# Patient Record
Sex: MALE | Race: NATIVE HAWAIIAN OR OTHER PACIFIC ISLANDER | NOT HISPANIC OR LATINO | Employment: OTHER | URBAN - NONMETROPOLITAN AREA
[De-identification: names, ages, dates, MRNs, and addresses within clinical notes are randomized per-mention and may not be internally consistent; named-entity substitution may affect disease eponyms.]

---

## 2019-05-28 ENCOUNTER — HOSPITAL ENCOUNTER (EMERGENCY)
Facility: OTHER | Age: 74
Discharge: HOME | End: 2019-05-28
Attending: FAMILY MEDICINE | Admitting: FAMILY MEDICINE
Payer: MEDICARE

## 2019-05-28 VITALS
HEART RATE: 72 BPM | TEMPERATURE: 98.5 F | SYSTOLIC BLOOD PRESSURE: 148 MMHG | HEIGHT: 72 IN | RESPIRATION RATE: 18 BRPM | WEIGHT: 200 LBS | OXYGEN SATURATION: 96 % | DIASTOLIC BLOOD PRESSURE: 87 MMHG | BODY MASS INDEX: 27.09 KG/M2

## 2019-05-28 DIAGNOSIS — F03.918 SENILE DEMENTIA, WITH BEHAVIORAL DISTURBANCE (H): ICD-10-CM

## 2019-05-28 LAB
ALBUMIN SERPL-MCNC: 4 G/DL (ref 3.5–5.7)
ALBUMIN UR-MCNC: NEGATIVE MG/DL
ALP SERPL-CCNC: 77 U/L (ref 34–104)
ALT SERPL W P-5'-P-CCNC: 12 U/L (ref 7–52)
AMPHETAMINES UR QL SCN: NOT DETECTED
ANION GAP SERPL CALCULATED.3IONS-SCNC: 7 MMOL/L (ref 3–14)
APAP SERPL-MCNC: <0.2 UG/ML (ref 0–30)
APPEARANCE UR: CLEAR
APTT PPP: 34 SEC (ref 29–50)
AST SERPL W P-5'-P-CCNC: 24 U/L (ref 13–39)
BARBITURATES UR QL: NOT DETECTED
BASOPHILS # BLD AUTO: 0.1 10E9/L (ref 0–0.2)
BASOPHILS NFR BLD AUTO: 0.8 %
BENZODIAZ UR QL: NOT DETECTED
BILIRUB SERPL-MCNC: 0.5 MG/DL (ref 0.3–1)
BILIRUB UR QL STRIP: NEGATIVE
BUN SERPL-MCNC: 15 MG/DL (ref 7–25)
BUPRENORPHINE UR QL: NOT DETECTED NG/ML
CALCIUM SERPL-MCNC: 9.2 MG/DL (ref 8.6–10.3)
CANNABINOIDS UR QL: NOT DETECTED NG/ML
CHLORIDE SERPL-SCNC: 102 MMOL/L (ref 98–107)
CO2 SERPL-SCNC: 26 MMOL/L (ref 21–31)
COCAINE UR QL: NOT DETECTED
COLOR UR AUTO: YELLOW
CREAT SERPL-MCNC: 0.98 MG/DL (ref 0.7–1.3)
D-METHAMPHET UR QL: NOT DETECTED NG/ML
DIFFERENTIAL METHOD BLD: ABNORMAL
EOSINOPHIL # BLD AUTO: 0.1 10E9/L (ref 0–0.7)
EOSINOPHIL NFR BLD AUTO: 1 %
ERYTHROCYTE [DISTWIDTH] IN BLOOD BY AUTOMATED COUNT: 12.9 % (ref 10–15)
ETHANOL SERPL-MCNC: <0.01 %
GFR SERPL CREATININE-BSD FRML MDRD: 75 ML/MIN/{1.73_M2}
GLUCOSE SERPL-MCNC: 166 MG/DL (ref 70–105)
GLUCOSE UR STRIP-MCNC: NEGATIVE MG/DL
HCT VFR BLD AUTO: 39.4 % (ref 40–53)
HGB BLD-MCNC: 12.9 G/DL (ref 13.3–17.7)
HGB UR QL STRIP: NEGATIVE
IMM GRANULOCYTES # BLD: 0 10E9/L (ref 0–0.4)
IMM GRANULOCYTES NFR BLD: 0.4 %
INR PPP: 0.99 (ref 0–1.3)
KETONES UR STRIP-MCNC: NEGATIVE MG/DL
LEUKOCYTE ESTERASE UR QL STRIP: NEGATIVE
LYMPHOCYTES # BLD AUTO: 1.2 10E9/L (ref 0.8–5.3)
LYMPHOCYTES NFR BLD AUTO: 15.7 %
MCH RBC QN AUTO: 29.2 PG (ref 26.5–33)
MCHC RBC AUTO-ENTMCNC: 32.7 G/DL (ref 31.5–36.5)
MCV RBC AUTO: 89 FL (ref 78–100)
METHADONE UR QL SCN: NOT DETECTED
MONOCYTES # BLD AUTO: 0.7 10E9/L (ref 0–1.3)
MONOCYTES NFR BLD AUTO: 9.5 %
NEUTROPHILS # BLD AUTO: 5.6 10E9/L (ref 1.6–8.3)
NEUTROPHILS NFR BLD AUTO: 72.6 %
NITRATE UR QL: NEGATIVE
OPIATES UR QL SCN: NOT DETECTED
OXYCODONE UR QL: NOT DETECTED NG/ML
PCP UR QL SCN: NOT DETECTED
PH UR STRIP: 6 PH (ref 5–9)
PLATELET # BLD AUTO: 233 10E9/L (ref 150–450)
POTASSIUM SERPL-SCNC: 4.1 MMOL/L (ref 3.5–5.1)
PROPOXYPH UR QL: NOT DETECTED NG/ML
PROT SERPL-MCNC: 6.5 G/DL (ref 6.4–8.9)
RBC # BLD AUTO: 4.42 10E12/L (ref 4.4–5.9)
SALICYLATES SERPL-MCNC: <0 MG/DL (ref 15–30)
SODIUM SERPL-SCNC: 135 MMOL/L (ref 134–144)
SOURCE: NORMAL
SP GR UR STRIP: <1.005 (ref 1–1.03)
TRICYCLICS UR QL SCN: NOT DETECTED NG/ML
TROPONIN I SERPL-MCNC: <0.03 UG/L (ref 0–0.03)
UROBILINOGEN UR STRIP-ACNC: 1 EU/DL (ref 0.2–1)
WBC # BLD AUTO: 7.7 10E9/L (ref 4–11)

## 2019-05-28 PROCEDURE — 93005 ELECTROCARDIOGRAM TRACING: CPT

## 2019-05-28 PROCEDURE — 80320 DRUG SCREEN QUANTALCOHOLS: CPT | Performed by: FAMILY MEDICINE

## 2019-05-28 PROCEDURE — 85610 PROTHROMBIN TIME: CPT | Performed by: FAMILY MEDICINE

## 2019-05-28 PROCEDURE — 36415 COLL VENOUS BLD VENIPUNCTURE: CPT | Performed by: FAMILY MEDICINE

## 2019-05-28 PROCEDURE — 80307 DRUG TEST PRSMV CHEM ANLYZR: CPT | Performed by: FAMILY MEDICINE

## 2019-05-28 PROCEDURE — 80329 ANALGESICS NON-OPIOID 1 OR 2: CPT | Performed by: FAMILY MEDICINE

## 2019-05-28 PROCEDURE — 99283 EMERGENCY DEPT VISIT LOW MDM: CPT | Performed by: FAMILY MEDICINE

## 2019-05-28 PROCEDURE — 81003 URINALYSIS AUTO W/O SCOPE: CPT | Performed by: FAMILY MEDICINE

## 2019-05-28 PROCEDURE — 84484 ASSAY OF TROPONIN QUANT: CPT | Performed by: FAMILY MEDICINE

## 2019-05-28 PROCEDURE — 80053 COMPREHEN METABOLIC PANEL: CPT | Performed by: FAMILY MEDICINE

## 2019-05-28 PROCEDURE — 85730 THROMBOPLASTIN TIME PARTIAL: CPT | Performed by: FAMILY MEDICINE

## 2019-05-28 PROCEDURE — 99282 EMERGENCY DEPT VISIT SF MDM: CPT | Mod: Z6 | Performed by: FAMILY MEDICINE

## 2019-05-28 PROCEDURE — 85025 COMPLETE CBC W/AUTO DIFF WBC: CPT | Performed by: FAMILY MEDICINE

## 2019-05-28 PROCEDURE — 93010 ELECTROCARDIOGRAM REPORT: CPT | Performed by: INTERNAL MEDICINE

## 2019-05-28 RX ORDER — OLANZAPINE 2.5 MG/1
2.5 TABLET, FILM COATED ORAL AT BEDTIME
COMMUNITY
End: 2019-05-28

## 2019-05-28 RX ORDER — SUCRALFATE 1 G/1
1 TABLET ORAL
COMMUNITY
End: 2022-01-01

## 2019-05-28 RX ORDER — ATENOLOL 25 MG/1
25 TABLET ORAL DAILY
COMMUNITY
End: 2022-07-18

## 2019-05-28 RX ORDER — SERTRALINE HYDROCHLORIDE 100 MG/1
100 TABLET, FILM COATED ORAL DAILY
COMMUNITY
End: 2022-07-18

## 2019-05-28 RX ORDER — LISINOPRIL 20 MG/1
20 TABLET ORAL DAILY
COMMUNITY
End: 2022-07-18

## 2019-05-28 RX ORDER — CHLORHEXIDINE GLUCONATE ORAL RINSE 1.2 MG/ML
15 SOLUTION DENTAL 2 TIMES DAILY
Status: ON HOLD | COMMUNITY
End: 2022-07-19

## 2019-05-28 RX ORDER — OLANZAPINE 2.5 MG/1
5 TABLET, FILM COATED ORAL AT BEDTIME
Qty: 30 TABLET | Refills: 0 | Status: ON HOLD | OUTPATIENT
Start: 2019-05-28 | End: 2022-07-19

## 2019-05-28 RX ORDER — ATORVASTATIN CALCIUM 40 MG/1
40 TABLET, FILM COATED ORAL DAILY
COMMUNITY
End: 2022-07-18

## 2019-05-28 RX ORDER — POLYETHYLENE GLYCOL 3350 17 G/17G
1 POWDER, FOR SOLUTION ORAL DAILY
COMMUNITY
End: 2022-01-01

## 2019-05-28 RX ORDER — LATANOPROST 50 UG/ML
1 SOLUTION/ DROPS OPHTHALMIC AT BEDTIME
COMMUNITY
End: 2022-01-01

## 2019-05-28 RX ORDER — ACETAMINOPHEN 325 MG/1
975 TABLET ORAL 3 TIMES DAILY PRN
COMMUNITY
End: 2022-01-01

## 2019-05-28 SDOH — HEALTH STABILITY: MENTAL HEALTH: HOW OFTEN DO YOU HAVE A DRINK CONTAINING ALCOHOL?: NEVER

## 2019-05-28 ASSESSMENT — MIFFLIN-ST. JEOR: SCORE: 1685.19

## 2019-05-28 NOTE — ED PROVIDER NOTES
History     Chief Complaint   Patient presents with     Suicidal     HPI  Jimmy Marcus is a 73 year old male who presents to ER for evaluation of suicidial ideation . Patient  Currently is delusional talking about having been sold and forced to live somewhere forever with chains around his ankles and eventually being sent here. Says he needs to get an  . Patient states was sad this AM and was just very upset because no one would help him get home to his kids . He denies suicidality . He is not showing aggressive behaviors     Allergies:  Allergies   Allergen Reactions     Peanuts [Nuts] Anaphylaxis     Motrin [Ibuprofen]        Problem List:    There are no active problems to display for this patient.       Past Medical History:    History reviewed. No pertinent past medical history.    Past Surgical History:    History reviewed. No pertinent surgical history.    Family History:    History reviewed. No pertinent family history.    Social History:  Marital Status:    Social History     Tobacco Use     Smoking status: Never Smoker     Smokeless tobacco: Never Used   Substance Use Topics     Alcohol use: Not Currently     Frequency: Never     Drug use: Never        Medications:      acetaminophen (TYLENOL) 325 MG tablet   aspirin (ASA) 81 MG chewable tablet   atenolol (TENORMIN) 25 MG tablet   atorvastatin (LIPITOR) 40 MG tablet   chlorhexidine (CHLORHEXIDINE) 0.12 % solution   latanoprost (XALATAN) 0.005 % ophthalmic solution   lisinopril (PRINIVIL/ZESTRIL) 20 MG tablet   OLANZapine (ZYPREXA) 2.5 MG tablet   sertraline (ZOLOFT) 100 MG tablet   sucralfate (CARAFATE) 1 GM tablet   insulin aspart (NOVOLOG FLEXPEN) 100 UNIT/ML pen   polyethylene glycol (MIRALAX/GLYCOLAX) packet         Review of Systems   Unable to perform ROS: Mental status change   patient delusional     Physical Exam   BP: 127/75  Heart Rate: 66  Temp: 98.2  F (36.8  C)  Resp: 18  Height: 182.9 cm (6')  Weight: 90.7 kg (200 lb)  SpO2:  96 %      Physical Exam   Constitutional: He appears well-developed and well-nourished.   HENT:   Head: Normocephalic and atraumatic.   Cardiovascular: Normal rate, regular rhythm and normal heart sounds.   Pulmonary/Chest: Effort normal and breath sounds normal.   Abdominal: Soft. Bowel sounds are normal.   Neurological: He is alert.   Skin: Skin is warm.   Few excoriations lower extremities     Nursing note and vitals reviewed.      ED Course        Procedures          Patient presents to ER from [facility] in [place] with concern of suicidal ideation . Patient with baseline dementia and frequent comments of suicidal ideation . Upon arrival to ER patient denies suicidal ideation . Patient triaged to exam room. CRT eval requested however due to baseline dementia CRt stated patient did not qualify for eval. Patient history and physical completed. Patient does not appear suicidal but quite delusional which from [facility] communications seems to be baseline. Patient labs and diagnostics reviewed. No acute metabolic abnormalities. NO recent trauma or focal neurologic findings on exam to indicate need for imaging studies. Patient will be discharged back to NH with recommendations to increase dose of zyprexa and schedule follow up with primary care provider. Patient may benefit from short term placement in geriatric psychitaric unit for med adjustment .          Results for orders placed or performed during the hospital encounter of 05/28/19 (from the past 24 hour(s))   CBC with platelets differential   Result Value Ref Range    WBC 7.7 4.0 - 11.0 10e9/L    RBC Count 4.42 4.4 - 5.9 10e12/L    Hemoglobin 12.9 (L) 13.3 - 17.7 g/dL    Hematocrit 39.4 (L) 40.0 - 53.0 %    MCV 89 78 - 100 fl    MCH 29.2 26.5 - 33.0 pg    MCHC 32.7 31.5 - 36.5 g/dL    RDW 12.9 10.0 - 15.0 %    Platelet Count 233 150 - 450 10e9/L    Diff Method Automated Method     % Neutrophils 72.6 %    % Lymphocytes 15.7 %    % Monocytes 9.5 %     % Eosinophils 1.0 %    % Basophils 0.8 %    % Immature Granulocytes 0.4 %    Absolute Neutrophil 5.6 1.6 - 8.3 10e9/L    Absolute Lymphocytes 1.2 0.8 - 5.3 10e9/L    Absolute Monocytes 0.7 0.0 - 1.3 10e9/L    Absolute Eosinophils 0.1 0.0 - 0.7 10e9/L    Absolute Basophils 0.1 0.0 - 0.2 10e9/L    Abs Immature Granulocytes 0.0 0 - 0.4 10e9/L   INR   Result Value Ref Range    INR 0.99 0 - 1.3   Partial thromboplastin time   Result Value Ref Range    PTT 34 29 - 50 sec       Medications - No data to display    Assessments & Plan (with Medical Decision Making)     I have reviewed the nursing notes.    I have reviewed the findings, diagnosis, plan and need for follow up with the patient.         Medication List      There are no discharge medications for this visit.         Final diagnoses:   Senile dementia, with behavioral disturbance       5/28/2019   [facility] AND [facility] Nika Rogers MD  05/28/19 5260

## 2019-05-28 NOTE — ED NOTES
Call placed to [facility] to arrange for transportation home.  ETA of transport staff 20 min ([phone]).  Patient readied.

## 2019-05-28 NOTE — ED NOTES
"As pt is moving to wheelchair, pt states \"Im just going to go back there and kill myself.\"  Provider, charge nurse,  notified.   "

## 2019-05-28 NOTE — ED AVS SNAPSHOT
[facility]: [phone]                                              INTERAGENCY TRANSFER FORM - LAB / IMAGING / EKG / EMG RESULTS   2019                   Hospital Admission Date: 2019  OSMIN RAMON   : 1945  Sex: Male         Attending Provider:  Nika Johnson MD    Allergies:  Peanuts [Nuts], Motrin [Ibuprofen]    Infection:  None   Service:  EMERGENCY ME    Ht:  1.829 m (6')   Wt:  90.7 kg (200 lb)   Admission Wt:  90.7 kg (200 lb)    BMI:  27.12 kg/m 2   BSA:  2.15 m 2            Patient PCP Information     None on File         Lab Results - 3 Days      Troponin I (now) [[id number]]  Resulted: 19 1159, Result status: Final result   Ordering provider:  Nika Johnson MD  19 1141 Resulting lab:  [facility]    Specimen Information    Type Source Collected On   Blood   19 1115          Components    Component Value Reference Range Flag Lab   Troponin I ES <0.030 0.000 - 0.034 ug/L   [facility]            *UA reflex to Microscopic [[id number]]  Resulted: 19 1150, Result status: Final result   Ordering provider:  Nika Johnson MD  19 1140 Resulting lab:  [facility]    Specimen Information    Type Source Collected On   Midstream Urine Urine clean catch 19 1141          Components    Component Value Reference Range Flag Lab   Color Urine Yellow     [facility]   Appearance Urine Clear     [facility]   Glucose Urine Negative NEG^Negative mg/dL   [facility]   Bilirubin Urine Negative NEG^Negative   [facility]   Ketones Urine Negative NEG^Negative mg/dL   [facility]   Specific Gravity Urine <1.005 1.000 - 1.030   [facility]   Blood Urine Negative NEG^Negative   [facility]   pH Urine 6.0 5.0 - 9.0 pH   [facility]   Protein Albumin Urine Negative NEG^Negative mg/dL   [facility]   Urobilinogen Urine 1.0 0.2 - 1.0 EU/dL   [facility]   Nitrite  Urine Negative NEG^Negative   [facility]   Leukocyte Esterase Urine Negative NEG^Negative   [facility]   Source Midstream Urine     [facility]            Drug of Abuse Screen Urine GH [[id number]]  Resulted: 05/28/19 1149, Result status: Final result   Ordering provider:  Nika Johnson MD  05/28/19 1103 Resulting lab:  [facility]    Specimen Information    Type Source Collected On   Urine Urine clean catch 05/28/19 1132          Components    Component Value Reference Range Flag Lab   Amphetamine Qual Urine Not Detected NDET^Not Detected   [facility]   Benzodiazepine Qual Urine Not Detected NDET^Not Detected   [facility]   Cocaine Qual Urine Not Detected NDET^Not Detected   [facility]   Methadone Qual Urine Not Detected NDET^Not Detected   [facility]   PCP Qual Urine Not Detected NDET^Not Detected   [facility]   Opiates Qualitative Urine Not Detected NDET^Not Detected   [facility]   Oxycodone Qualitative Urine Not Detected NDET^Not Detected ng/mL   [facility]   Propoxyphene Qualitative Urine Not Detected NDET^Not Detected ng/mL   [facility]   Tricyclic Antidepressants Qual Urine Not Detected NDET^Not Detected ng/mL   [facility]   Methamphetamine Qualitative Urine Not Detected NDET^Not Detected ng/mL   [facility]   Barbiturates Qual Urine Not Detected NDET^Not Detected   [facility]   Cannabinoids Qualitative Urine Not Detected NDET^Not Detected ng/mL   [facility]   Buprenorphine Qualitative Urine Not Detected NDET^Not Detected ng/mL   [facility]            Comprehensive metabolic panel [[id number]] (Abnormal)  Resulted: 05/28/19 1147, Result status: Final result   Ordering provider:  Nika Johnson MD  05/28/19 1103 Resulting lab:  [facility]    Specimen Information    Type Source Collected On   Blood   05/28/19 1115          Components    Component Value Reference Range Flag Lab   Sodium 135 134 - 144 mmol/L   [facility]   Potassium 4.1 3.5  - 5.1 mmol/L   [facility]   Chloride 102 98 - 107 mmol/L   [facility]   Carbon Dioxide 26 21 - 31 mmol/L   [facility]   Anion Gap 7 3 - 14 mmol/L   [facility]   Glucose 166 70 - 105 mg/dL H [facility]   Urea Nitrogen 15 7 - 25 mg/dL   [facility]   Creatinine 0.98 0.70 - 1.30 mg/dL   [facility]   GFR Estimate 75 >60 mL/min/{1.73_m2}   [facility]   GFR Estimate If Black >90 >60 mL/min/{1.73_m2}   [facility]   Calcium 9.2 8.6 - 10.3 mg/dL   [facility]   Bilirubin Total 0.5 0.3 - 1.0 mg/dL   [facility]   Albumin 4.0 3.5 - 5.7 g/dL   [facility]   Protein Total 6.5 6.4 - 8.9 g/dL   [facility]   Alkaline Phosphatase 77 34 - 104 U/L   [facility]   ALT 12 7 - 52 U/L   [facility]   AST 24 13 - 39 U/L   [facility]            Salicylate level [[id number]] (Abnormal)  Resulted: 05/28/19 1147, Result status: Final result   Ordering provider:  Nika Johnson MD  05/28/19 1103 Resulting lab:  [facility]    Specimen Information    Type Source Collected On   Blood   05/28/19 1115          Components    Component Value Reference Range Flag Lab   Salicylate Level <0 15 - 30 mg/dL  [facility]   Comment:  Therapeutic:        <20  Anti inflammatory:  15-30              Acetaminophen GH [[id number]]  Resulted: 05/28/19 1147, Result status: Final result   Ordering provider:  Nika Johnson MD  05/28/19 1103 Resulting lab:  [facility]    Specimen Information    Type Source Collected On   Blood   05/28/19 1115          Components    Component Value Reference Range Flag Lab   Acetaminophen <0.2 0.0 - 30.0 ug/mL   [facility]            Ethanol GH [[id number]]  Resulted: 05/28/19 1147, Result status: Final result   Ordering provider:  Nika Johnson MD  05/28/19 1103 Resulting lab:  [facility]    Specimen Information    Type Source Collected On   Blood   05/28/19 1115          Components    Component Value Reference Range Flag Lab    Ethanol g/dL <0.01 <0.01 %   [facility]            INR [[id number]]  Resulted: 05/28/19 1131, Result status: Final result   Ordering provider:  Nika Johnson MD  05/28/19 1103 Resulting lab:  [facility]    Specimen Information    Type Source Collected On   Blood   05/28/19 1115          Components    Component Value Reference Range Flag Lab   INR 0.99 0 - 1.3   [facility]            Partial thromboplastin time [[id number]]  Resulted: 05/28/19 1131, Result status: Final result   Ordering provider:  Nika Johnson MD  05/28/19 1103 Resulting lab:  [facility]    Specimen Information    Type Source Collected On   Blood   05/28/19 1115          Components    Component Value Reference Range Flag Lab   PTT 34 29 - 50 sec   [facility]            CBC with platelets differential [[id number]] (Abnormal)  Resulted: 05/28/19 1124, Result status: Final result   Ordering provider:  Nika Johnson MD  05/28/19 1103 Resulting lab:  [facility]    Specimen Information    Type Source Collected On   Blood   05/28/19 1115          Components    Component Value Reference Range Flag Lab   WBC 7.7 4.0 - 11.0 10e9/L   [facility]   RBC Count 4.42 4.4 - 5.9 10e12/L   [facility]   Hemoglobin 12.9 13.3 - 17.7 g/dL  [facility]   Hematocrit 39.4 40.0 - 53.0 %  [facility]   MCV 89 78 - 100 fl   [facility]   MCH 29.2 26.5 - 33.0 pg   [facility]   MCHC 32.7 31.5 - 36.5 g/dL   [facility]   RDW 12.9 10.0 - 15.0 %   [facility]   Platelet Count 233 150 - 450 10e9/L   [facility]   Diff Method Automated Method     [facility]   % Neutrophils 72.6 %   [facility]   % Lymphocytes 15.7 %   [facility]   % Monocytes 9.5 %   [facility]   % Eosinophils 1.0 %   [facility]   % Basophils 0.8 %   [facility]   % Immature Granulocytes 0.4 %   [facility]   Absolute Neutrophil 5.6 1.6 - 8.3 10e9/L   [facility]   Absolute Lymphocytes 1.2 0.8 - 5.3 10e9/L    [facility]   Absolute Monocytes 0.7 0.0 - 1.3 10e9/L   [facility]   Absolute Eosinophils 0.1 0.0 - 0.7 10e9/L   [facility]   Absolute Basophils 0.1 0.0 - 0.2 10e9/L   [facility]   Abs Immature Granulocytes 0.0 0 - 0.4 10e9/L   [facility]            Testing Performed By     Lab - Abbreviation Name Director Address Valid Date Range    1743 - [facility] Unknown [address]  [facility] [address] 08/07/15 0948 - Present            Unresulted Labs (24h ago, onward)    None      Encounter-Level Documents:    There are no encounter-level documents.     Order-Level Documents:    There are no order-level documents.

## 2019-05-28 NOTE — ED NOTES
"[facility] contacted, Marilou, manager, states pt is normally confused, dementia, and hallucinations.  Reports pt normally expresses suicidal ideation along with chest pain, but thoughts are normally fleeting and pt has never committed self harm.  Today pt expressed this morning that he was very lonely and that he \"wanted to kill himself\", states 10 times since admission in April.  Has upcoming neuro appt in [place] in June.  RN from NH states \"maybe he just needs like a behavioral unit or something, we are just scared that he is like way down the james and suicidal.\" Informed NH that pt will be evaluated, if discharged NH states to call [facility] to transport.   "

## 2019-05-28 NOTE — ED TRIAGE NOTES
"Pt woke up this morning feeling suicidal, hx of depression. Pt reports missing his kids and was lonely. Pt was willing to come in and get checked out because this is not a normal feeling for the pt. Pt repeatedly denies having any plan to hurt himself and is not feeling suicidal. He is feeling down and depressed because \"he was thrown away. No body wants me\".   "

## 2019-05-28 NOTE — ED AVS SNAPSHOT
[facility]  [address]  [place] [address]  Phone:  [phone]  Fax:  [phone]                                    Jimmy Marcus   MRN: [id number]    Department:  [facility]   Date of Visit:  5/28/2019           After Visit Summary Signature Page    I have received my discharge instructions, and my questions have been answered. I have discussed any challenges I see with this plan with the nurse or doctor.    ..........................................................................................................................................  Patient/Patient Representative Signature      ..........................................................................................................................................  Patient Representative Print Name and Relationship to Patient    ..................................................               ................................................  Date                                   Time    ..........................................................................................................................................  Reviewed by Signature/Title    ...................................................              ..............................................  Date                                               Time          22EPIC Rev 08/18

## 2019-05-28 NOTE — DISCHARGE INSTRUCTIONS
Increase olanzapine to 5 mg at bedtime. Recommend schedule a follow up appointment with primary care physician to discuss other recommendations. Patient may benefit from short term placement in geriatric psychiatric unit

## 2019-05-29 DIAGNOSIS — F03.918 SENILE DEMENTIA, WITH BEHAVIORAL DISTURBANCE (H): Primary | ICD-10-CM

## 2019-05-30 NOTE — TELEPHONE ENCOUNTER
Refill request from [facility] [place] for:  OLANZapine (ZYPREXA) 2.5 MG tablet    Noted pt has no PCP and only 1 encounter (ED) 5/28/2019    Noted patient resides at [facility] in [place], [place].    Contacted pharmacy and they noted pt's PCP to be Dr. Kumar Palacios  [place], [place] and will route request to him.    Unable to refuse without provider.  Will route to provider for refusal.    Unable to complete prescription refill per RN Medication Refill Policy.................... Ria Pena ....................  5/30/2019   5:20 PM

## 2019-05-31 RX ORDER — OLANZAPINE 2.5 MG/1
2.5 TABLET, FILM COATED ORAL EVERY MORNING
Qty: 90 TABLET | OUTPATIENT
Start: 2019-05-31

## 2019-07-02 DIAGNOSIS — F03.918 SENILE DEMENTIA, WITH BEHAVIORAL DISTURBANCE (H): Primary | ICD-10-CM

## 2019-07-08 RX ORDER — OLANZAPINE 2.5 MG/1
5 TABLET, FILM COATED ORAL AT BEDTIME
Qty: 30 TABLET | OUTPATIENT
Start: 2019-07-08

## 2019-07-09 ENCOUNTER — APPOINTMENT (OUTPATIENT)
Dept: GENERAL RADIOLOGY | Facility: OTHER | Age: 74
End: 2019-07-09
Attending: EMERGENCY MEDICINE
Payer: MEDICARE

## 2019-07-09 ENCOUNTER — HOSPITAL ENCOUNTER (EMERGENCY)
Facility: OTHER | Age: 74
Discharge: NURSING, REHAB OR LONG-TERM CARE | End: 2019-07-09
Attending: EMERGENCY MEDICINE | Admitting: EMERGENCY MEDICINE
Payer: MEDICARE

## 2019-07-09 VITALS
SYSTOLIC BLOOD PRESSURE: 131 MMHG | BODY MASS INDEX: 24.61 KG/M2 | WEIGHT: 181.7 LBS | DIASTOLIC BLOOD PRESSURE: 70 MMHG | HEART RATE: 68 BPM | RESPIRATION RATE: 16 BRPM | OXYGEN SATURATION: 97 % | TEMPERATURE: 98.5 F | HEIGHT: 72 IN

## 2019-07-09 DIAGNOSIS — F41.0 ANXIETY ATTACK: ICD-10-CM

## 2019-07-09 LAB
ALBUMIN SERPL-MCNC: 3.7 G/DL (ref 3.5–5.7)
ALBUMIN UR-MCNC: NEGATIVE MG/DL
ALP SERPL-CCNC: 88 U/L (ref 34–104)
ALT SERPL W P-5'-P-CCNC: 12 U/L (ref 7–52)
ANION GAP SERPL CALCULATED.3IONS-SCNC: 10 MMOL/L (ref 3–14)
APPEARANCE UR: CLEAR
AST SERPL W P-5'-P-CCNC: 19 U/L (ref 13–39)
BASOPHILS # BLD AUTO: 0.1 10E9/L (ref 0–0.2)
BASOPHILS NFR BLD AUTO: 0.8 %
BILIRUB SERPL-MCNC: 0.5 MG/DL (ref 0.3–1)
BILIRUB UR QL STRIP: NEGATIVE
BUN SERPL-MCNC: 22 MG/DL (ref 7–25)
CALCIUM SERPL-MCNC: 8.9 MG/DL (ref 8.6–10.3)
CHLORIDE SERPL-SCNC: 106 MMOL/L (ref 98–107)
CO2 SERPL-SCNC: 26 MMOL/L (ref 21–31)
COLOR UR AUTO: YELLOW
CREAT SERPL-MCNC: 1.01 MG/DL (ref 0.7–1.3)
DIFFERENTIAL METHOD BLD: ABNORMAL
EOSINOPHIL # BLD AUTO: 0.2 10E9/L (ref 0–0.7)
EOSINOPHIL NFR BLD AUTO: 2 %
ERYTHROCYTE [DISTWIDTH] IN BLOOD BY AUTOMATED COUNT: 13.3 % (ref 10–15)
GFR SERPL CREATININE-BSD FRML MDRD: 72 ML/MIN/{1.73_M2}
GLUCOSE SERPL-MCNC: 118 MG/DL (ref 70–105)
GLUCOSE UR STRIP-MCNC: NEGATIVE MG/DL
HCT VFR BLD AUTO: 38.1 % (ref 40–53)
HGB BLD-MCNC: 12.1 G/DL (ref 13.3–17.7)
HGB UR QL STRIP: NEGATIVE
IMM GRANULOCYTES # BLD: 0 10E9/L (ref 0–0.4)
IMM GRANULOCYTES NFR BLD: 0.4 %
KETONES UR STRIP-MCNC: NEGATIVE MG/DL
LEUKOCYTE ESTERASE UR QL STRIP: NEGATIVE
LYMPHOCYTES # BLD AUTO: 1.3 10E9/L (ref 0.8–5.3)
LYMPHOCYTES NFR BLD AUTO: 17.9 %
MCH RBC QN AUTO: 28.2 PG (ref 26.5–33)
MCHC RBC AUTO-ENTMCNC: 31.8 G/DL (ref 31.5–36.5)
MCV RBC AUTO: 89 FL (ref 78–100)
MONOCYTES # BLD AUTO: 0.7 10E9/L (ref 0–1.3)
MONOCYTES NFR BLD AUTO: 9.6 %
NEUTROPHILS # BLD AUTO: 5.1 10E9/L (ref 1.6–8.3)
NEUTROPHILS NFR BLD AUTO: 69.3 %
NITRATE UR QL: NEGATIVE
PH UR STRIP: 5.5 PH (ref 5–9)
PLATELET # BLD AUTO: 218 10E9/L (ref 150–450)
POTASSIUM SERPL-SCNC: 4.2 MMOL/L (ref 3.5–5.1)
PROT SERPL-MCNC: 6.4 G/DL (ref 6.4–8.9)
RBC # BLD AUTO: 4.29 10E12/L (ref 4.4–5.9)
SODIUM SERPL-SCNC: 142 MMOL/L (ref 134–144)
SOURCE: NORMAL
SP GR UR STRIP: 1.01 (ref 1–1.03)
TROPONIN I SERPL-MCNC: <0.03 UG/L (ref 0–0.03)
UROBILINOGEN UR STRIP-ACNC: 1 EU/DL (ref 0.2–1)
WBC # BLD AUTO: 7.4 10E9/L (ref 4–11)

## 2019-07-09 PROCEDURE — 93005 ELECTROCARDIOGRAM TRACING: CPT

## 2019-07-09 PROCEDURE — 84484 ASSAY OF TROPONIN QUANT: CPT | Performed by: EMERGENCY MEDICINE

## 2019-07-09 PROCEDURE — 93010 ELECTROCARDIOGRAM REPORT: CPT | Performed by: INTERNAL MEDICINE

## 2019-07-09 PROCEDURE — 99282 EMERGENCY DEPT VISIT SF MDM: CPT | Mod: Z6 | Performed by: EMERGENCY MEDICINE

## 2019-07-09 PROCEDURE — 85025 COMPLETE CBC W/AUTO DIFF WBC: CPT | Performed by: EMERGENCY MEDICINE

## 2019-07-09 PROCEDURE — 81003 URINALYSIS AUTO W/O SCOPE: CPT | Performed by: EMERGENCY MEDICINE

## 2019-07-09 PROCEDURE — 71045 X-RAY EXAM CHEST 1 VIEW: CPT

## 2019-07-09 PROCEDURE — 99284 EMERGENCY DEPT VISIT MOD MDM: CPT | Mod: 25 | Performed by: EMERGENCY MEDICINE

## 2019-07-09 PROCEDURE — 36415 COLL VENOUS BLD VENIPUNCTURE: CPT | Performed by: EMERGENCY MEDICINE

## 2019-07-09 PROCEDURE — 80053 COMPREHEN METABOLIC PANEL: CPT | Performed by: EMERGENCY MEDICINE

## 2019-07-09 RX ORDER — SODIUM CHLORIDE 9 MG/ML
INJECTION, SOLUTION INTRAVENOUS CONTINUOUS
Status: DISCONTINUED | OUTPATIENT
Start: 2019-07-09 | End: 2019-07-09 | Stop reason: HOSPADM

## 2019-07-09 ASSESSMENT — MIFFLIN-ST. JEOR: SCORE: 1602.19

## 2019-07-09 NOTE — ED TRIAGE NOTES
"EMS Arrival Note  ________________________________  Jimmy Marcus is a 73 year old Male that arrives via [facility] fromassisted living in [place], [place], was at an appt at [facility] and told staff and dtr that his \"heart felt like it was not part of his body.\"  Pre hospital clinical presentation per patient  and EMS personnel includes heart palpatations.  Pre hospital personnel report vital signs of:  B/P 120/83; HR 58, RR 10;SpO2 95  Pre Hospital Cardiac rhythm reported as Normal Sinus  Pre hospital care included: Medication: none  Patient arrives with:  GCS 14  Airway intact  Breathing Assessment Normal.    Circulation Assessment Normal.  Patient arrives with a 20 IV at his left arm   Placed in room 909, gowned, warm blanket provided, side rails up,  ID verified and band placed, and call light within reach.       Previous living situation Residential Facility  "

## 2019-07-09 NOTE — ED PROVIDER NOTES
"  History     Chief Complaint   Patient presents with     Palpitations     HPI  Jimmy Marcus is a 73 year old male who comes in by ambulance from mental health appointment.  He lives in local assisted living.  He has some dementia.  Apparently while he was at his appointment with his counselor he began feeling like his heart was racing and like his \"heart felt like it was not part of my body\".  When I first see him and I asked him why he is here he said it was because of his short of breath and he really did not remember.  His daughter then arrived and she says that she was with him when this happened.  He had seemed his normal self earlier in the day and then during the appointment he started to get agitated and this happened.  At this time he is denying any chest pain heaviness tightness or squeezing or shortness of breath.  No recent illnesses or other complaints.    Allergies:  Allergies   Allergen Reactions     Peanuts [Nuts] Anaphylaxis     Motrin [Ibuprofen]        Problem List:    There are no active problems to display for this patient.       Past Medical History:    History reviewed. No pertinent past medical history.    Past Surgical History:    History reviewed. No pertinent surgical history.    Family History:    History reviewed. No pertinent family history.    Social History:  Marital Status:  Single [1]  Social History     Tobacco Use     Smoking status: Never Smoker     Smokeless tobacco: Never Used   Substance Use Topics     Alcohol use: Not Currently     Frequency: Never     Drug use: Never        Medications:      acetaminophen (TYLENOL) 325 MG tablet   aspirin (ASA) 81 MG chewable tablet   atenolol (TENORMIN) 25 MG tablet   atorvastatin (LIPITOR) 40 MG tablet   chlorhexidine (CHLORHEXIDINE) 0.12 % solution   insulin aspart (NOVOLOG FLEXPEN) 100 UNIT/ML pen   latanoprost (XALATAN) 0.005 % ophthalmic solution   lisinopril (PRINIVIL/ZESTRIL) 20 MG tablet   OLANZapine (ZYPREXA) 2.5 MG tablet "   polyethylene glycol (MIRALAX/GLYCOLAX) packet   sertraline (ZOLOFT) 100 MG tablet   sucralfate (CARAFATE) 1 GM tablet         Review of Systems   Unable to perform ROS: Dementia       Physical Exam   BP: 136/79  Pulse: 64  Heart Rate: 60  Temp: 98.5  F (36.9  C)  Resp: 10  Height: 182.9 cm (6')  Weight: 82.4 kg (181 lb 11.2 oz)(bed scale)  SpO2: 95 %      Physical Exam   Constitutional: He appears well-developed and well-nourished. No distress.   HENT:   Head: Normocephalic and atraumatic.   Eyes: Conjunctivae are normal.   Neck: Neck supple.   Cardiovascular: Normal rate, regular rhythm and normal heart sounds.   Pulmonary/Chest: Effort normal and breath sounds normal.   Abdominal: Soft. Bowel sounds are normal.   Neurological: He is alert.   Skin: Skin is warm and dry. He is not diaphoretic.   Psychiatric: He has a normal mood and affect. His behavior is normal.   Nursing note and vitals reviewed.      ED Course        Procedures                 Results for orders placed or performed during the hospital encounter of 07/09/19 (from the past 24 hour(s))   CBC with platelets differential   Result Value Ref Range    WBC 7.4 4.0 - 11.0 10e9/L    RBC Count 4.29 (L) 4.4 - 5.9 10e12/L    Hemoglobin 12.1 (L) 13.3 - 17.7 g/dL    Hematocrit 38.1 (L) 40.0 - 53.0 %    MCV 89 78 - 100 fl    MCH 28.2 26.5 - 33.0 pg    MCHC 31.8 31.5 - 36.5 g/dL    RDW 13.3 10.0 - 15.0 %    Platelet Count 218 150 - 450 10e9/L    Diff Method Automated Method     % Neutrophils 69.3 %    % Lymphocytes 17.9 %    % Monocytes 9.6 %    % Eosinophils 2.0 %    % Basophils 0.8 %    % Immature Granulocytes 0.4 %    Absolute Neutrophil 5.1 1.6 - 8.3 10e9/L    Absolute Lymphocytes 1.3 0.8 - 5.3 10e9/L    Absolute Monocytes 0.7 0.0 - 1.3 10e9/L    Absolute Eosinophils 0.2 0.0 - 0.7 10e9/L    Absolute Basophils 0.1 0.0 - 0.2 10e9/L    Abs Immature Granulocytes 0.0 0 - 0.4 10e9/L   Comprehensive metabolic panel   Result Value Ref Range    Sodium 142 134 - 144  mmol/L    Potassium 4.2 3.5 - 5.1 mmol/L    Chloride 106 98 - 107 mmol/L    Carbon Dioxide 26 21 - 31 mmol/L    Anion Gap 10 3 - 14 mmol/L    Glucose 118 (H) 70 - 105 mg/dL    Urea Nitrogen 22 7 - 25 mg/dL    Creatinine 1.01 0.70 - 1.30 mg/dL    GFR Estimate 72 >60 mL/min/[1.73_m2]    GFR Estimate If Black 87 >60 mL/min/[1.73_m2]    Calcium 8.9 8.6 - 10.3 mg/dL    Bilirubin Total 0.5 0.3 - 1.0 mg/dL    Albumin 3.7 3.5 - 5.7 g/dL    Protein Total 6.4 6.4 - 8.9 g/dL    Alkaline Phosphatase 88 34 - 104 U/L    ALT 12 7 - 52 U/L    AST 19 13 - 39 U/L   Troponin I   Result Value Ref Range    Troponin I ES <0.030 0.000 - 0.034 ug/L   XR Chest Port 1 View    Narrative    PROCEDURE:  XR CHEST PORT 1 VW    HISTORY:  Palpitations.     COMPARISON:  None.    FINDINGS:   The cardiac silhouette is normal in size. The pulmonary vasculature is  normal.  The lungs are clear. No pleural effusion or pneumothorax.      Impression    IMPRESSION:  No acute cardiopulmonary disease.      KILLIAN TROTTER MD   *UA reflex to Microscopic   Result Value Ref Range    Color Urine Yellow     Appearance Urine Clear     Glucose Urine Negative NEG^Negative mg/dL    Bilirubin Urine Negative NEG^Negative    Ketones Urine Negative NEG^Negative mg/dL    Specific Gravity Urine 1.015 1.000 - 1.030    Blood Urine Negative NEG^Negative    pH Urine 5.5 5.0 - 9.0 pH    Protein Albumin Urine Negative NEG^Negative mg/dL    Urobilinogen Urine 1.0 0.2 - 1.0 EU/dL    Nitrite Urine Negative NEG^Negative    Leukocyte Esterase Urine Negative NEG^Negative    Source Midstream Urine        Medications   sodium chloride 0.9% infusion ( Intravenous Hold 7/9/19 1305)       Assessments & Plan (with Medical Decision Making)     I have reviewed the nursing notes.    I have reviewed the findings, diagnosis, plan and need for follow up with the patient.  Labs are reassuring, he is feeling well.  I believe this was most likely anxiety type reaction.  He will be discharged in the   company of his daughter at this time.       Medication List      There are no discharge medications for this visit.         Final diagnoses:   Anxiety attack       7/9/2019   [facility]     Emerson Salgado MD  07/09/19 5227

## 2019-07-09 NOTE — ED AVS SNAPSHOT
[facility]  [address]  [place] [address]  Phone:  [phone]  Fax:  [phone]                                    Jimmy Marcus   MRN: [id number]    Department:  [facility]   Date of Visit:  7/9/2019           After Visit Summary Signature Page    I have received my discharge instructions, and my questions have been answered. I have discussed any challenges I see with this plan with the nurse or doctor.    ..........................................................................................................................................  Patient/Patient Representative Signature      ..........................................................................................................................................  Patient Representative Print Name and Relationship to Patient    ..................................................               ................................................  Date                                   Time    ..........................................................................................................................................  Reviewed by Signature/Title    ...................................................              ..............................................  Date                                               Time          22EPIC Rev 08/18

## 2022-01-01 ENCOUNTER — DOCUMENTATION ONLY (OUTPATIENT)
Dept: OTHER | Facility: CLINIC | Age: 77
End: 2022-01-01

## 2022-01-01 ENCOUNTER — PATIENT OUTREACH (OUTPATIENT)
Dept: CARE COORDINATION | Facility: CLINIC | Age: 77
End: 2022-01-01

## 2022-01-01 ENCOUNTER — TELEPHONE (OUTPATIENT)
Dept: FAMILY MEDICINE | Facility: OTHER | Age: 77
End: 2022-01-01

## 2022-01-01 VITALS
BODY MASS INDEX: 23.57 KG/M2 | OXYGEN SATURATION: 97 % | SYSTOLIC BLOOD PRESSURE: 130 MMHG | HEIGHT: 72 IN | DIASTOLIC BLOOD PRESSURE: 68 MMHG | RESPIRATION RATE: 20 BRPM | HEART RATE: 85 BPM | TEMPERATURE: 97.6 F | WEIGHT: 174 LBS

## 2022-01-01 LAB
ANION GAP SERPL CALCULATED.3IONS-SCNC: 6 MMOL/L (ref 3–14)
ANION GAP SERPL CALCULATED.3IONS-SCNC: 6 MMOL/L (ref 3–14)
BUN SERPL-MCNC: 21 MG/DL (ref 7–25)
BUN SERPL-MCNC: 25 MG/DL (ref 7–25)
CALCIUM SERPL-MCNC: 8.5 MG/DL (ref 8.6–10.3)
CALCIUM SERPL-MCNC: 9 MG/DL (ref 8.6–10.3)
CHLORIDE BLD-SCNC: 105 MMOL/L (ref 98–107)
CHLORIDE BLD-SCNC: 106 MMOL/L (ref 98–107)
CO2 SERPL-SCNC: 24 MMOL/L (ref 21–31)
CO2 SERPL-SCNC: 27 MMOL/L (ref 21–31)
CREAT SERPL-MCNC: 0.9 MG/DL (ref 0.7–1.3)
CREAT SERPL-MCNC: 0.9 MG/DL (ref 0.7–1.3)
ERYTHROCYTE [DISTWIDTH] IN BLOOD BY AUTOMATED COUNT: 13.8 % (ref 10–15)
ERYTHROCYTE [DISTWIDTH] IN BLOOD BY AUTOMATED COUNT: 14.1 % (ref 10–15)
FLUAV RNA SPEC QL NAA+PROBE: NEGATIVE
FLUBV RNA RESP QL NAA+PROBE: NEGATIVE
GFR SERPL CREATININE-BSD FRML MDRD: 88 ML/MIN/1.73M2
GFR SERPL CREATININE-BSD FRML MDRD: 88 ML/MIN/1.73M2
GLUCOSE BLD-MCNC: 114 MG/DL (ref 70–105)
GLUCOSE BLD-MCNC: 121 MG/DL (ref 70–105)
GLUCOSE BLDC GLUCOMTR-MCNC: 126 MG/DL (ref 70–99)
HCT VFR BLD AUTO: 32 % (ref 40–53)
HCT VFR BLD AUTO: 32.2 % (ref 40–53)
HGB BLD-MCNC: 10.3 G/DL (ref 13.3–17.7)
HGB BLD-MCNC: 10.5 G/DL (ref 13.3–17.7)
MCH RBC QN AUTO: 28.2 PG (ref 26.5–33)
MCH RBC QN AUTO: 28.4 PG (ref 26.5–33)
MCHC RBC AUTO-ENTMCNC: 32.2 G/DL (ref 31.5–36.5)
MCHC RBC AUTO-ENTMCNC: 32.6 G/DL (ref 31.5–36.5)
MCV RBC AUTO: 86 FL (ref 78–100)
MCV RBC AUTO: 88 FL (ref 78–100)
PLATELET # BLD AUTO: 265 10E3/UL (ref 150–450)
PLATELET # BLD AUTO: 271 10E3/UL (ref 150–450)
POTASSIUM BLD-SCNC: 3.7 MMOL/L (ref 3.5–5.1)
POTASSIUM BLD-SCNC: 3.9 MMOL/L (ref 3.5–5.1)
RBC # BLD AUTO: 3.63 10E6/UL (ref 4.4–5.9)
RBC # BLD AUTO: 3.73 10E6/UL (ref 4.4–5.9)
RSV RNA SPEC NAA+PROBE: NEGATIVE
SARS-COV-2 RNA RESP QL NAA+PROBE: NEGATIVE
SODIUM SERPL-SCNC: 136 MMOL/L (ref 134–144)
SODIUM SERPL-SCNC: 138 MMOL/L (ref 134–144)
WBC # BLD AUTO: 8.3 10E3/UL (ref 4–11)
WBC # BLD AUTO: 8.3 10E3/UL (ref 4–11)

## 2022-01-01 PROCEDURE — 91305 HC RX IP 250 OP 636: CPT | Performed by: FAMILY MEDICINE

## 2022-01-01 PROCEDURE — 250N000011 HC RX IP 250 OP 636: Performed by: SURGERY

## 2022-01-01 PROCEDURE — 99231 SBSQ HOSP IP/OBS SF/LOW 25: CPT | Mod: 25 | Performed by: SURGERY

## 2022-01-01 PROCEDURE — 87637 SARSCOV2&INF A&B&RSV AMP PRB: CPT | Performed by: SURGERY

## 2022-01-01 PROCEDURE — 36415 COLL VENOUS BLD VENIPUNCTURE: CPT | Performed by: INTERNAL MEDICINE

## 2022-01-01 PROCEDURE — XW023W7 INTRODUCTION OF COVID-19 VACCINE BOOSTER INTO MUSCLE, PERCUTANEOUS APPROACH, NEW TECHNOLOGY GROUP 7: ICD-10-PCS | Performed by: FAMILY MEDICINE

## 2022-01-01 PROCEDURE — 250N000013 HC RX MED GY IP 250 OP 250 PS 637: Performed by: INTERNAL MEDICINE

## 2022-01-01 PROCEDURE — 250N000011 HC RX IP 250 OP 636: Performed by: FAMILY MEDICINE

## 2022-01-01 PROCEDURE — 250N000013 HC RX MED GY IP 250 OP 250 PS 637: Performed by: SURGERY

## 2022-01-01 PROCEDURE — 120N000001 HC R&B MED SURG/OB

## 2022-01-01 PROCEDURE — 82310 ASSAY OF CALCIUM: CPT | Performed by: INTERNAL MEDICINE

## 2022-01-01 PROCEDURE — 85027 COMPLETE CBC AUTOMATED: CPT | Performed by: INTERNAL MEDICINE

## 2022-01-01 PROCEDURE — 80048 BASIC METABOLIC PNL TOTAL CA: CPT | Performed by: INTERNAL MEDICINE

## 2022-01-01 PROCEDURE — 0054A HC ADMIN COVID VAC PFIZER TRS-SUCR, BOOSTER: CPT | Performed by: FAMILY MEDICINE

## 2022-01-01 PROCEDURE — 250N000013 HC RX MED GY IP 250 OP 250 PS 637: Performed by: FAMILY MEDICINE

## 2022-01-01 RX ORDER — SUCRALFATE 1 G/1
1 TABLET ORAL
DISCHARGE
Start: 2022-01-01

## 2022-01-01 RX ORDER — DIPHENHYDRAMINE HYDROCHLORIDE 50 MG/ML
50 INJECTION INTRAMUSCULAR; INTRAVENOUS
Status: DISCONTINUED | OUTPATIENT
Start: 2022-01-01 | End: 2022-01-01 | Stop reason: HOSPADM

## 2022-01-01 RX ORDER — LIDOCAINE 4 G/G
1 PATCH TOPICAL DAILY PRN
DISCHARGE
Start: 2022-01-01

## 2022-01-01 RX ORDER — NYSTATIN 100000 [USP'U]/G
POWDER TOPICAL 2 TIMES DAILY PRN
DISCHARGE
Start: 2022-01-01

## 2022-01-01 RX ORDER — LATANOPROST 50 UG/ML
1 SOLUTION/ DROPS OPHTHALMIC AT BEDTIME
DISCHARGE
Start: 2022-01-01

## 2022-01-01 RX ORDER — LOPERAMIDE HYDROCHLORIDE 2 MG/1
TABLET ORAL
DISCHARGE
Start: 2022-01-01

## 2022-01-01 RX ORDER — OLANZAPINE 10 MG/1
10 TABLET ORAL DAILY
DISCHARGE
Start: 2022-01-01

## 2022-01-01 RX ORDER — CETIRIZINE HYDROCHLORIDE 10 MG/1
10 TABLET ORAL DAILY
DISCHARGE
Start: 2022-01-01

## 2022-01-01 RX ORDER — ACETAMINOPHEN 325 MG/1
975 TABLET ORAL 3 TIMES DAILY PRN
DISCHARGE
Start: 2022-01-01

## 2022-01-01 RX ORDER — EMOLLIENT BASE
CREAM (GRAM) TOPICAL DAILY
DISCHARGE
Start: 2022-01-01

## 2022-01-01 RX ORDER — ALUMINA, MAGNESIA, AND SIMETHICONE 2400; 2400; 240 MG/30ML; MG/30ML; MG/30ML
10 SUSPENSION ORAL 4 TIMES DAILY PRN
DISCHARGE
Start: 2022-01-01

## 2022-01-01 RX ORDER — HYDROXYZINE HYDROCHLORIDE 10 MG/1
10 TABLET, FILM COATED ORAL EVERY 8 HOURS PRN
DISCHARGE
Start: 2022-01-01

## 2022-01-01 RX ORDER — MINERAL OIL/PETROLATUM,WHITE
OINTMENT (GRAM) TOPICAL DAILY PRN
DISCHARGE
Start: 2022-01-01

## 2022-01-01 RX ORDER — FLUOXETINE 10 MG/1
10 CAPSULE ORAL DAILY
DISCHARGE
Start: 2022-01-01

## 2022-01-01 RX ORDER — CHOLECALCIFEROL (VITAMIN D3) 50 MCG
1 TABLET ORAL 2 TIMES DAILY
DISCHARGE
Start: 2022-01-01

## 2022-01-01 RX ORDER — HYDROCORTISONE 2.5 %
CREAM (GRAM) TOPICAL 2 TIMES DAILY
DISCHARGE
Start: 2022-01-01

## 2022-01-01 RX ORDER — POLYETHYLENE GLYCOL 3350 17 G/17G
1 POWDER, FOR SOLUTION ORAL DAILY
DISCHARGE
Start: 2022-01-01

## 2022-01-01 RX ORDER — POLYETHYLENE GLYCOL 3350 17 G/17G
17 POWDER, FOR SOLUTION ORAL DAILY PRN
Status: DISCONTINUED | OUTPATIENT
Start: 2022-01-01 | End: 2022-01-01 | Stop reason: HOSPADM

## 2022-01-01 RX ORDER — DIPHENHYDRAMINE HCL 50 MG
50 CAPSULE ORAL
Status: DISCONTINUED | OUTPATIENT
Start: 2022-01-01 | End: 2022-01-01 | Stop reason: HOSPADM

## 2022-01-01 RX ORDER — GINSENG 100 MG
CAPSULE ORAL DAILY PRN
DISCHARGE
Start: 2022-01-01

## 2022-01-01 RX ADMIN — CEFUROXIME AXETIL 500 MG: 250 TABLET, FILM COATED ORAL at 10:30

## 2022-01-01 RX ADMIN — LORATADINE 10 MG: 10 TABLET ORAL at 10:30

## 2022-01-01 RX ADMIN — OXYCODONE HYDROCHLORIDE 5 MG: 5 TABLET ORAL at 18:49

## 2022-01-01 RX ADMIN — OLANZAPINE 10 MG: 2.5 TABLET ORAL at 22:03

## 2022-01-01 RX ADMIN — LATANOPROST 1 DROP: 50 SOLUTION/ DROPS OPHTHALMIC at 22:04

## 2022-01-01 RX ADMIN — Medication 250 MG: at 10:20

## 2022-01-01 RX ADMIN — ENOXAPARIN SODIUM 40 MG: 40 INJECTION SUBCUTANEOUS at 22:03

## 2022-01-01 RX ADMIN — FLUOXETINE 30 MG: 20 CAPSULE ORAL at 10:30

## 2022-01-01 RX ADMIN — BNT162B2 30 MCG: 0.23 INJECTION, SUSPENSION INTRAMUSCULAR at 12:45

## 2022-01-01 RX ADMIN — Medication 250 MG: at 10:30

## 2022-01-01 RX ADMIN — LORATADINE 10 MG: 10 TABLET ORAL at 10:20

## 2022-01-01 RX ADMIN — FLUOXETINE 30 MG: 20 CAPSULE ORAL at 10:20

## 2022-01-01 RX ADMIN — Medication 250 MG: at 22:02

## 2022-01-01 ASSESSMENT — ACTIVITIES OF DAILY LIVING (ADL)
ADLS_ACUITY_SCORE: 52
ADLS_ACUITY_SCORE: 53
ADLS_ACUITY_SCORE: 52
ADLS_ACUITY_SCORE: 49
ADLS_ACUITY_SCORE: 53
ADLS_ACUITY_SCORE: 52
ADLS_ACUITY_SCORE: 52
ADLS_ACUITY_SCORE: 53
ADLS_ACUITY_SCORE: 49
ADLS_ACUITY_SCORE: 52
ADLS_ACUITY_SCORE: 53
ADLS_ACUITY_SCORE: 53

## 2022-07-18 ENCOUNTER — HOSPITAL ENCOUNTER (INPATIENT)
Facility: OTHER | Age: 77
LOS: 9 days | Discharge: HOME | DRG: 264 | End: 2022-07-27
Attending: PHYSICIAN ASSISTANT | Admitting: SURGERY
Payer: MEDICARE

## 2022-07-18 ENCOUNTER — ANESTHESIA EVENT (OUTPATIENT)
Dept: MEDSURG UNIT | Facility: OTHER | Age: 77
DRG: 264 | End: 2022-07-18
Payer: MEDICARE

## 2022-07-18 ENCOUNTER — ANESTHESIA (OUTPATIENT)
Dept: MEDSURG UNIT | Facility: OTHER | Age: 77
DRG: 264 | End: 2022-07-18
Payer: MEDICARE

## 2022-07-18 ENCOUNTER — ANESTHESIA (OUTPATIENT)
Dept: SURGERY | Facility: OTHER | Age: 77
DRG: 264 | End: 2022-07-18
Payer: MEDICARE

## 2022-07-18 ENCOUNTER — ANESTHESIA EVENT (OUTPATIENT)
Dept: SURGERY | Facility: OTHER | Age: 77
DRG: 264 | End: 2022-07-18
Payer: MEDICARE

## 2022-07-18 DIAGNOSIS — L03.317 CELLULITIS OF LEFT BUTTOCK: ICD-10-CM

## 2022-07-18 DIAGNOSIS — F02.818 LATE ONSET ALZHEIMER'S DEMENTIA WITH BEHAVIORAL DISTURBANCE (H): ICD-10-CM

## 2022-07-18 DIAGNOSIS — L85.3 DRY SKIN DERMATITIS: ICD-10-CM

## 2022-07-18 DIAGNOSIS — L89.322 DECUBITUS ULCER OF LEFT BUTTOCK, STAGE 2 (H): ICD-10-CM

## 2022-07-18 DIAGNOSIS — H40.50X0 GLAUCOMA ASSOCIATED WITH ANOMALIES OF IRIS: ICD-10-CM

## 2022-07-18 DIAGNOSIS — G30.1 LATE ONSET ALZHEIMER'S DEMENTIA WITH BEHAVIORAL DISTURBANCE (H): ICD-10-CM

## 2022-07-18 DIAGNOSIS — M79.89 NECROTIZING SOFT TISSUE INFECTION: Primary | ICD-10-CM

## 2022-07-18 DIAGNOSIS — L03.317 CELLULITIS OF BUTTOCK: ICD-10-CM

## 2022-07-18 DIAGNOSIS — T78.40XA ALLERGIC REACTION, INITIAL ENCOUNTER: ICD-10-CM

## 2022-07-18 DIAGNOSIS — R19.7 DIARRHEA, UNSPECIFIED TYPE: ICD-10-CM

## 2022-07-18 DIAGNOSIS — Z11.52 ENCOUNTER FOR SCREENING LABORATORY TESTING FOR COVID-19 VIRUS: ICD-10-CM

## 2022-07-18 DIAGNOSIS — M25.50 ARTHRALGIA, UNSPECIFIED JOINT: ICD-10-CM

## 2022-07-18 DIAGNOSIS — K21.00 GASTROESOPHAGEAL REFLUX DISEASE WITH ESOPHAGITIS WITHOUT HEMORRHAGE: ICD-10-CM

## 2022-07-18 DIAGNOSIS — K59.00 CONSTIPATION, UNSPECIFIED CONSTIPATION TYPE: ICD-10-CM

## 2022-07-18 DIAGNOSIS — Q13.2 GLAUCOMA ASSOCIATED WITH ANOMALIES OF IRIS: ICD-10-CM

## 2022-07-18 DIAGNOSIS — L89.322 STAGE II PRESSURE ULCER OF LEFT BUTTOCK (H): ICD-10-CM

## 2022-07-18 DIAGNOSIS — E11.9 TYPE 2 DIABETES MELLITUS WITHOUT COMPLICATION, WITHOUT LONG-TERM CURRENT USE OF INSULIN (H): ICD-10-CM

## 2022-07-18 LAB
ANION GAP SERPL CALCULATED.3IONS-SCNC: 8 MMOL/L (ref 3–14)
BASOPHILS # BLD AUTO: 0 10E3/UL (ref 0–0.2)
BASOPHILS NFR BLD AUTO: 0 %
BUN SERPL-MCNC: 23 MG/DL (ref 7–25)
CALCIUM SERPL-MCNC: 9.1 MG/DL (ref 8.6–10.3)
CHLORIDE BLD-SCNC: 99 MMOL/L (ref 98–107)
CO2 SERPL-SCNC: 26 MMOL/L (ref 21–31)
CREAT SERPL-MCNC: 0.93 MG/DL (ref 0.7–1.3)
CRP SERPL-MCNC: 43.8 MG/L
EOSINOPHIL # BLD AUTO: 0 10E3/UL (ref 0–0.7)
EOSINOPHIL NFR BLD AUTO: 0 %
ERYTHROCYTE [DISTWIDTH] IN BLOOD BY AUTOMATED COUNT: 13.7 % (ref 10–15)
ERYTHROCYTE [SEDIMENTATION RATE] IN BLOOD BY WESTERGREN METHOD: 20 MM/HR (ref 0–20)
FLUAV RNA SPEC QL NAA+PROBE: NEGATIVE
FLUBV RNA RESP QL NAA+PROBE: NEGATIVE
GFR SERPL CREATININE-BSD FRML MDRD: 85 ML/MIN/1.73M2
GLUCOSE BLD-MCNC: 201 MG/DL (ref 70–105)
HCT VFR BLD AUTO: 35.9 % (ref 40–53)
HGB BLD-MCNC: 11.8 G/DL (ref 13.3–17.7)
IMM GRANULOCYTES # BLD: 0.2 10E3/UL
IMM GRANULOCYTES NFR BLD: 1 %
LACTATE SERPL-SCNC: 1.1 MMOL/L (ref 0.7–2)
LYMPHOCYTES # BLD AUTO: 0.5 10E3/UL (ref 0.8–5.3)
LYMPHOCYTES NFR BLD AUTO: 2 %
MCH RBC QN AUTO: 28.7 PG (ref 26.5–33)
MCHC RBC AUTO-ENTMCNC: 32.9 G/DL (ref 31.5–36.5)
MCV RBC AUTO: 87 FL (ref 78–100)
MONOCYTES # BLD AUTO: 0.8 10E3/UL (ref 0–1.3)
MONOCYTES NFR BLD AUTO: 4 %
NEUTROPHILS # BLD AUTO: 19.2 10E3/UL (ref 1.6–8.3)
NEUTROPHILS NFR BLD AUTO: 93 %
NRBC # BLD AUTO: 0 10E3/UL
NRBC BLD AUTO-RTO: 0 /100
PLATELET # BLD AUTO: 341 10E3/UL (ref 150–450)
POTASSIUM BLD-SCNC: 4.1 MMOL/L (ref 3.5–5.1)
RBC # BLD AUTO: 4.11 10E6/UL (ref 4.4–5.9)
RSV RNA SPEC NAA+PROBE: NEGATIVE
SARS-COV-2 RNA RESP QL NAA+PROBE: NEGATIVE
SODIUM SERPL-SCNC: 133 MMOL/L (ref 134–144)
WBC # BLD AUTO: 20.6 10E3/UL (ref 4–11)

## 2022-07-18 PROCEDURE — 86140 C-REACTIVE PROTEIN: CPT | Performed by: PHYSICIAN ASSISTANT

## 2022-07-18 PROCEDURE — 272N000001 HC OR GENERAL SUPPLY STERILE: Performed by: SURGERY

## 2022-07-18 PROCEDURE — 99140 ANES COMP EMERGENCY COND: CPT | Performed by: NURSE ANESTHETIST, CERTIFIED REGISTERED

## 2022-07-18 PROCEDURE — 85025 COMPLETE CBC W/AUTO DIFF WBC: CPT | Performed by: PHYSICIAN ASSISTANT

## 2022-07-18 PROCEDURE — 80048 BASIC METABOLIC PNL TOTAL CA: CPT | Performed by: SURGERY

## 2022-07-18 PROCEDURE — 0JB90ZZ EXCISION OF BUTTOCK SUBCUTANEOUS TISSUE AND FASCIA, OPEN APPROACH: ICD-10-PCS | Performed by: SURGERY

## 2022-07-18 PROCEDURE — 96367 TX/PROPH/DG ADDL SEQ IV INF: CPT | Performed by: PHYSICIAN ASSISTANT

## 2022-07-18 PROCEDURE — 258N000003 HC RX IP 258 OP 636: Performed by: SURGERY

## 2022-07-18 PROCEDURE — 250N000025 HC SEVOFLURANE, PER MIN: Performed by: SURGERY

## 2022-07-18 PROCEDURE — 360N000075 HC SURGERY LEVEL 2, PER MIN: Performed by: SURGERY

## 2022-07-18 PROCEDURE — 11042 DBRDMT SUBQ TIS 1ST 20SQCM/<: CPT | Performed by: NURSE ANESTHETIST, CERTIFIED REGISTERED

## 2022-07-18 PROCEDURE — 99285 EMERGENCY DEPT VISIT HI MDM: CPT | Mod: 25 | Performed by: PHYSICIAN ASSISTANT

## 2022-07-18 PROCEDURE — 250N000011 HC RX IP 250 OP 636: Performed by: PHYSICIAN ASSISTANT

## 2022-07-18 PROCEDURE — 370N000017 HC ANESTHESIA TECHNICAL FEE, PER MIN: Performed by: SURGERY

## 2022-07-18 PROCEDURE — 87040 BLOOD CULTURE FOR BACTERIA: CPT | Performed by: PHYSICIAN ASSISTANT

## 2022-07-18 PROCEDURE — 250N000011 HC RX IP 250 OP 636: Performed by: SURGERY

## 2022-07-18 PROCEDURE — 99100 ANES PT EXTEME AGE<1 YR&>70: CPT | Performed by: NURSE ANESTHETIST, CERTIFIED REGISTERED

## 2022-07-18 PROCEDURE — C9803 HOPD COVID-19 SPEC COLLECT: HCPCS | Performed by: PHYSICIAN ASSISTANT

## 2022-07-18 PROCEDURE — 87205 SMEAR GRAM STAIN: CPT | Performed by: PHYSICIAN ASSISTANT

## 2022-07-18 PROCEDURE — 85652 RBC SED RATE AUTOMATED: CPT | Performed by: PHYSICIAN ASSISTANT

## 2022-07-18 PROCEDURE — 11042 DBRDMT SUBQ TIS 1ST 20SQCM/<: CPT | Performed by: SURGERY

## 2022-07-18 PROCEDURE — 250N000011 HC RX IP 250 OP 636: Performed by: NURSE ANESTHETIST, CERTIFIED REGISTERED

## 2022-07-18 PROCEDURE — 36410 VNPNXR 3YR/> PHY/QHP DX/THER: CPT | Performed by: NURSE ANESTHETIST, CERTIFIED REGISTERED

## 2022-07-18 PROCEDURE — 36415 COLL VENOUS BLD VENIPUNCTURE: CPT | Performed by: PHYSICIAN ASSISTANT

## 2022-07-18 PROCEDURE — 120N000001 HC R&B MED SURG/OB

## 2022-07-18 PROCEDURE — 87637 SARSCOV2&INF A&B&RSV AMP PRB: CPT | Performed by: PHYSICIAN ASSISTANT

## 2022-07-18 PROCEDURE — 250N000009 HC RX 250: Performed by: SURGERY

## 2022-07-18 PROCEDURE — 99222 1ST HOSP IP/OBS MODERATE 55: CPT | Mod: 25 | Performed by: SURGERY

## 2022-07-18 PROCEDURE — 11045 DBRDMT SUBQ TISS EACH ADDL: CPT | Performed by: SURGERY

## 2022-07-18 PROCEDURE — 258N000003 HC RX IP 258 OP 636: Performed by: PHYSICIAN ASSISTANT

## 2022-07-18 PROCEDURE — 99285 EMERGENCY DEPT VISIT HI MDM: CPT | Performed by: PHYSICIAN ASSISTANT

## 2022-07-18 PROCEDURE — 710N000010 HC RECOVERY PHASE 1, LEVEL 2, PER MIN: Performed by: SURGERY

## 2022-07-18 PROCEDURE — 250N000009 HC RX 250: Performed by: NURSE ANESTHETIST, CERTIFIED REGISTERED

## 2022-07-18 PROCEDURE — 250N000013 HC RX MED GY IP 250 OP 250 PS 637: Performed by: SURGERY

## 2022-07-18 PROCEDURE — 83605 ASSAY OF LACTIC ACID: CPT | Performed by: PHYSICIAN ASSISTANT

## 2022-07-18 PROCEDURE — 258N000003 HC RX IP 258 OP 636: Performed by: NURSE ANESTHETIST, CERTIFIED REGISTERED

## 2022-07-18 PROCEDURE — 96365 THER/PROPH/DIAG IV INF INIT: CPT | Performed by: PHYSICIAN ASSISTANT

## 2022-07-18 RX ORDER — ONDANSETRON 2 MG/ML
4 INJECTION INTRAMUSCULAR; INTRAVENOUS EVERY 6 HOURS PRN
Status: DISCONTINUED | OUTPATIENT
Start: 2022-07-18 | End: 2022-01-01 | Stop reason: HOSPADM

## 2022-07-18 RX ORDER — OXYCODONE HYDROCHLORIDE 5 MG/1
10 TABLET ORAL EVERY 4 HOURS PRN
Status: DISCONTINUED | OUTPATIENT
Start: 2022-07-18 | End: 2022-01-01 | Stop reason: HOSPADM

## 2022-07-18 RX ORDER — CETIRIZINE HYDROCHLORIDE 10 MG/1
1 TABLET ORAL DAILY
COMMUNITY
Start: 2022-02-21 | End: 2022-07-18

## 2022-07-18 RX ORDER — NALOXONE HYDROCHLORIDE 0.4 MG/ML
0.2 INJECTION, SOLUTION INTRAMUSCULAR; INTRAVENOUS; SUBCUTANEOUS
Status: DISCONTINUED | OUTPATIENT
Start: 2022-07-18 | End: 2022-01-01 | Stop reason: HOSPADM

## 2022-07-18 RX ORDER — HYDROMORPHONE HCL IN WATER/PF 6 MG/30 ML
0.2 PATIENT CONTROLLED ANALGESIA SYRINGE INTRAVENOUS
Status: DISCONTINUED | OUTPATIENT
Start: 2022-07-18 | End: 2022-01-01 | Stop reason: HOSPADM

## 2022-07-18 RX ORDER — HYDROXYZINE HYDROCHLORIDE 10 MG/1
10 TABLET, FILM COATED ORAL EVERY 8 HOURS PRN
COMMUNITY
Start: 2022-04-15 | End: 2022-01-01

## 2022-07-18 RX ORDER — LIDOCAINE HYDROCHLORIDE 20 MG/ML
INJECTION, SOLUTION INFILTRATION; PERINEURAL PRN
Status: DISCONTINUED | OUTPATIENT
Start: 2022-07-18 | End: 2022-07-18

## 2022-07-18 RX ORDER — NALOXONE HYDROCHLORIDE 0.4 MG/ML
0.4 INJECTION, SOLUTION INTRAMUSCULAR; INTRAVENOUS; SUBCUTANEOUS
Status: DISCONTINUED | OUTPATIENT
Start: 2022-07-18 | End: 2022-01-01 | Stop reason: HOSPADM

## 2022-07-18 RX ORDER — HYDROCORTISONE 2.5 %
CREAM (GRAM) TOPICAL 2 TIMES DAILY
COMMUNITY
Start: 2022-06-08 | End: 2022-01-01

## 2022-07-18 RX ORDER — SODIUM CHLORIDE, SODIUM LACTATE, POTASSIUM CHLORIDE, CALCIUM CHLORIDE 600; 310; 30; 20 MG/100ML; MG/100ML; MG/100ML; MG/100ML
INJECTION, SOLUTION INTRAVENOUS CONTINUOUS
Status: DISCONTINUED | OUTPATIENT
Start: 2022-07-18 | End: 2022-07-19

## 2022-07-18 RX ORDER — CETIRIZINE HYDROCHLORIDE 10 MG/1
10 TABLET ORAL DAILY
COMMUNITY
Start: 2022-07-11 | End: 2022-01-01

## 2022-07-18 RX ORDER — LIDOCAINE 40 MG/G
CREAM TOPICAL
Status: DISCONTINUED | OUTPATIENT
Start: 2022-07-18 | End: 2022-01-01 | Stop reason: HOSPADM

## 2022-07-18 RX ORDER — ACETAMINOPHEN 325 MG/1
650 TABLET ORAL EVERY 4 HOURS PRN
Status: DISCONTINUED | OUTPATIENT
Start: 2022-07-21 | End: 2022-01-01 | Stop reason: HOSPADM

## 2022-07-18 RX ORDER — HYDROXYZINE HYDROCHLORIDE 10 MG/1
10 TABLET, FILM COATED ORAL EVERY 4 HOURS PRN
Status: DISCONTINUED | OUTPATIENT
Start: 2022-07-18 | End: 2022-01-01 | Stop reason: HOSPADM

## 2022-07-18 RX ORDER — POLYETHYLENE GLYCOL 3350 17 G/17G
17 POWDER, FOR SOLUTION ORAL DAILY
Status: DISCONTINUED | OUTPATIENT
Start: 2022-07-19 | End: 2022-01-01

## 2022-07-18 RX ORDER — FENTANYL CITRATE 50 UG/ML
INJECTION, SOLUTION INTRAMUSCULAR; INTRAVENOUS PRN
Status: DISCONTINUED | OUTPATIENT
Start: 2022-07-18 | End: 2022-07-18

## 2022-07-18 RX ORDER — ACETAMINOPHEN 325 MG/1
975 TABLET ORAL EVERY 8 HOURS
Status: DISPENSED | OUTPATIENT
Start: 2022-07-18 | End: 2022-07-21

## 2022-07-18 RX ORDER — MAGNESIUM HYDROXIDE/ALUMINUM HYDROXICE/SIMETHICONE 120; 1200; 1200 MG/30ML; MG/30ML; MG/30ML
15 SUSPENSION ORAL EVERY 4 HOURS PRN
Status: DISCONTINUED | OUTPATIENT
Start: 2022-07-18 | End: 2022-01-01 | Stop reason: HOSPADM

## 2022-07-18 RX ORDER — MAGNESIUM HYDROXIDE 1200 MG/15ML
LIQUID ORAL PRN
Status: DISCONTINUED | OUTPATIENT
Start: 2022-07-18 | End: 2022-07-18 | Stop reason: HOSPADM

## 2022-07-18 RX ORDER — GINSENG 100 MG
CAPSULE ORAL DAILY PRN
COMMUNITY
Start: 2021-12-28 | End: 2022-01-01

## 2022-07-18 RX ORDER — GLYCOPYRROLATE 0.2 MG/ML
INJECTION, SOLUTION INTRAMUSCULAR; INTRAVENOUS PRN
Status: DISCONTINUED | OUTPATIENT
Start: 2022-07-18 | End: 2022-07-18

## 2022-07-18 RX ORDER — OLANZAPINE 2.5 MG/1
10 TABLET, FILM COATED ORAL AT BEDTIME
Status: DISCONTINUED | OUTPATIENT
Start: 2022-07-18 | End: 2022-01-01 | Stop reason: HOSPADM

## 2022-07-18 RX ORDER — SODIUM CHLORIDE, SODIUM LACTATE, POTASSIUM CHLORIDE, CALCIUM CHLORIDE 600; 310; 30; 20 MG/100ML; MG/100ML; MG/100ML; MG/100ML
INJECTION, SOLUTION INTRAVENOUS CONTINUOUS PRN
Status: DISCONTINUED | OUTPATIENT
Start: 2022-07-18 | End: 2022-07-18

## 2022-07-18 RX ORDER — EMOLLIENT BASE
CREAM (GRAM) TOPICAL DAILY
COMMUNITY
Start: 2021-08-26 | End: 2022-01-01

## 2022-07-18 RX ORDER — HYDROMORPHONE HCL IN WATER/PF 6 MG/30 ML
0.4 PATIENT CONTROLLED ANALGESIA SYRINGE INTRAVENOUS
Status: DISCONTINUED | OUTPATIENT
Start: 2022-07-18 | End: 2022-01-01 | Stop reason: HOSPADM

## 2022-07-18 RX ORDER — BISACODYL 10 MG
10 SUPPOSITORY, RECTAL RECTAL DAILY PRN
Status: DISCONTINUED | OUTPATIENT
Start: 2022-07-18 | End: 2022-01-01 | Stop reason: HOSPADM

## 2022-07-18 RX ORDER — OXYCODONE HYDROCHLORIDE 5 MG/1
5 TABLET ORAL EVERY 4 HOURS PRN
Status: DISCONTINUED | OUTPATIENT
Start: 2022-07-18 | End: 2022-01-01 | Stop reason: HOSPADM

## 2022-07-18 RX ORDER — AMOXICILLIN 250 MG
1 CAPSULE ORAL 2 TIMES DAILY
Status: DISCONTINUED | OUTPATIENT
Start: 2022-07-18 | End: 2022-01-01 | Stop reason: HOSPADM

## 2022-07-18 RX ORDER — LATANOPROST 50 UG/ML
1 SOLUTION/ DROPS OPHTHALMIC AT BEDTIME
Status: DISCONTINUED | OUTPATIENT
Start: 2022-07-18 | End: 2022-01-01 | Stop reason: HOSPADM

## 2022-07-18 RX ORDER — ALUMINA, MAGNESIA, AND SIMETHICONE 2400; 2400; 240 MG/30ML; MG/30ML; MG/30ML
10 SUSPENSION ORAL 4 TIMES DAILY PRN
COMMUNITY
End: 2022-01-01

## 2022-07-18 RX ORDER — FLUOXETINE 10 MG/1
10 CAPSULE ORAL DAILY
COMMUNITY
End: 2022-01-01

## 2022-07-18 RX ORDER — PREDNISONE 20 MG/1
TABLET ORAL
Status: ON HOLD | COMMUNITY
Start: 2022-07-13 | End: 2022-01-01

## 2022-07-18 RX ORDER — LORATADINE 10 MG/1
10 TABLET ORAL DAILY
Status: DISCONTINUED | OUTPATIENT
Start: 2022-07-19 | End: 2022-01-01 | Stop reason: HOSPADM

## 2022-07-18 RX ORDER — PROPOFOL 10 MG/ML
INJECTION, EMULSION INTRAVENOUS PRN
Status: DISCONTINUED | OUTPATIENT
Start: 2022-07-18 | End: 2022-07-18

## 2022-07-18 RX ORDER — ENOXAPARIN SODIUM 100 MG/ML
40 INJECTION SUBCUTANEOUS EVERY 24 HOURS
Status: DISCONTINUED | OUTPATIENT
Start: 2022-07-19 | End: 2022-01-01 | Stop reason: HOSPADM

## 2022-07-18 RX ORDER — PROCHLORPERAZINE MALEATE 5 MG
5 TABLET ORAL EVERY 6 HOURS PRN
Status: DISCONTINUED | OUTPATIENT
Start: 2022-07-18 | End: 2022-01-01 | Stop reason: HOSPADM

## 2022-07-18 RX ORDER — ONDANSETRON 4 MG/1
4 TABLET, ORALLY DISINTEGRATING ORAL EVERY 6 HOURS PRN
Status: DISCONTINUED | OUTPATIENT
Start: 2022-07-18 | End: 2022-01-01 | Stop reason: HOSPADM

## 2022-07-18 RX ORDER — SENNOSIDES 8.6 MG
650 CAPSULE ORAL
Status: ON HOLD | COMMUNITY
End: 2022-07-19

## 2022-07-18 RX ADMIN — ROCURONIUM BROMIDE 50 MG: 50 INJECTION, SOLUTION INTRAVENOUS at 18:39

## 2022-07-18 RX ADMIN — TAZOBACTAM SODIUM AND PIPERACILLIN SODIUM 4.5 G: 500; 4 INJECTION, SOLUTION INTRAVENOUS at 18:03

## 2022-07-18 RX ADMIN — LIDOCAINE HYDROCHLORIDE 100 MG: 20 INJECTION, SOLUTION INFILTRATION; PERINEURAL at 18:39

## 2022-07-18 RX ADMIN — OLANZAPINE 10 MG: 2.5 TABLET ORAL at 22:07

## 2022-07-18 RX ADMIN — FENTANYL CITRATE 50 MCG: 50 INJECTION, SOLUTION INTRAMUSCULAR; INTRAVENOUS at 18:53

## 2022-07-18 RX ADMIN — SODIUM CHLORIDE, SODIUM LACTATE, POTASSIUM CHLORIDE, AND CALCIUM CHLORIDE: 600; 310; 30; 20 INJECTION, SOLUTION INTRAVENOUS at 18:34

## 2022-07-18 RX ADMIN — TAZOBACTAM SODIUM AND PIPERACILLIN SODIUM 3.38 G: 375; 3 INJECTION, SOLUTION INTRAVENOUS at 18:37

## 2022-07-18 RX ADMIN — GLYCOPYRROLATE 0.2 MG: 0.2 INJECTION, SOLUTION INTRAMUSCULAR; INTRAVENOUS at 18:37

## 2022-07-18 RX ADMIN — FENTANYL CITRATE 50 MCG: 50 INJECTION, SOLUTION INTRAMUSCULAR; INTRAVENOUS at 18:37

## 2022-07-18 RX ADMIN — ACETAMINOPHEN 975 MG: 325 TABLET, FILM COATED ORAL at 22:09

## 2022-07-18 RX ADMIN — PROPOFOL 130 MG: 10 INJECTION, EMULSION INTRAVENOUS at 18:39

## 2022-07-18 RX ADMIN — SUGAMMADEX 300 MG: 100 INJECTION, SOLUTION INTRAVENOUS at 19:11

## 2022-07-18 RX ADMIN — VANCOMYCIN HYDROCHLORIDE 1500 MG: 1 INJECTION, POWDER, LYOPHILIZED, FOR SOLUTION INTRAVENOUS at 17:08

## 2022-07-18 RX ADMIN — SENNOSIDES AND DOCUSATE SODIUM 1 TABLET: 50; 8.6 TABLET ORAL at 22:10

## 2022-07-18 RX ADMIN — SODIUM CHLORIDE, POTASSIUM CHLORIDE, SODIUM LACTATE AND CALCIUM CHLORIDE: 600; 310; 30; 20 INJECTION, SOLUTION INTRAVENOUS at 23:59

## 2022-07-18 ASSESSMENT — ENCOUNTER SYMPTOMS
STRIDOR: 0
EYE DISCHARGE: 0
CONFUSION: 0
FACIAL SWELLING: 0
FACIAL ASYMMETRY: 0
ABDOMINAL PAIN: 0
AGITATION: 0
SEIZURES: 0
FEVER: 0
TREMORS: 0
NECK STIFFNESS: 0
FLANK PAIN: 0

## 2022-07-18 ASSESSMENT — ACTIVITIES OF DAILY LIVING (ADL)
ADLS_ACUITY_SCORE: 33
ADLS_ACUITY_SCORE: 35

## 2022-07-18 NOTE — ANESTHESIA PREPROCEDURE EVALUATION
Anesthesia Pre-Procedure Evaluation    Patient: Jimmy Marcus   MRN: [id number] : 1945        Procedure : Procedure(s):  irrigation and debridement left buttock          History reviewed. No pertinent past medical history.   History reviewed. No pertinent surgical history.   Allergies   Allergen Reactions     Peanuts [Nuts] Anaphylaxis     Motrin [Ibuprofen]       Social History     Tobacco Use     Smoking status: Never Smoker     Smokeless tobacco: Never Used   Substance Use Topics     Alcohol use: Not Currently      Wt Readings from Last 1 Encounters:   22 82.1 kg (181 lb)        Anesthesia Evaluation            ROS/MED HX  ENT/Pulmonary:     (+) allergic rhinitis,     Neurologic:     (+) dementia,     Cardiovascular:       METS/Exercise Tolerance: 1 - Eating, dressing    Hematologic:  - neg hematologic  ROS     Musculoskeletal:   (+) arthritis,     GI/Hepatic:     (+) GERD,     Renal/Genitourinary:  - neg Renal ROS     Endo:  - neg endo ROS     Psychiatric/Substance Use:     (+) psychiatric history depression     Infectious Disease: Comment: Necrotizing wound of the left buttock      Malignancy:  - neg malignancy ROS     Other:  - neg other ROS          Physical Exam    Airway        Mallampati: II   TM distance: > 3 FB   Neck ROM: full   Mouth opening: > 3 cm    Respiratory Devices and Support         Dental       (+) upper dentures and lower dentures      Cardiovascular   cardiovascular exam normal       Rhythm and rate: regular and normal     Pulmonary   pulmonary exam normal        breath sounds clear to auscultation           OUTSIDE LABS:  CBC:   Lab Results   Component Value Date    WBC 20.6 (H) 2022    WBC 7.4 2019    HGB 11.8 (L) 2022    HGB 12.1 (L) 2019    HCT 35.9 (L) 2022    HCT 38.1 (L) 2019     2022     2019     BMP:   Lab Results   Component Value Date     (L) 2022     2019    POTASSIUM 4.1  07/18/2022    POTASSIUM 4.2 07/09/2019    CHLORIDE 99 07/18/2022    CHLORIDE 106 07/09/2019    CO2 26 07/18/2022    CO2 26 07/09/2019    BUN 23 07/18/2022    BUN 22 07/09/2019    CR 0.93 07/18/2022    CR 1.01 07/09/2019     (H) 07/18/2022     (H) 07/09/2019     COAGS:   Lab Results   Component Value Date    PTT 34 05/28/2019    INR 0.99 05/28/2019     POC: No results found for: BGM, HCG, HCGS  HEPATIC:   Lab Results   Component Value Date    ALBUMIN 3.7 07/09/2019    PROTTOTAL 6.4 07/09/2019    ALT 12 07/09/2019    AST 19 07/09/2019    ALKPHOS 88 07/09/2019    BILITOTAL 0.5 07/09/2019     OTHER:   Lab Results   Component Value Date    LACT 1.1 07/18/2022    GISELA 9.1 07/18/2022    CRP 43.8 (H) 07/18/2022    SED 20 07/18/2022       Anesthesia Plan    ASA Status:  3, emergent    NPO Status:  NPO Appropriate    Anesthesia Type: General.     - Airway: ETT              Consents    Anesthesia Plan(s) and associated risks, benefits, and realistic alternatives discussed. Questions answered and patient/representative(s) expressed understanding.    - Discussed:     - Discussed with:  Implied consent/emergency         Postoperative Care            Comments:                David Kellerman, APRN CRNA

## 2022-07-18 NOTE — PHARMACY
Pharmacy- Renal Dose Adjustment    Patient Active Problem List   Diagnosis     Cellulitis of left buttock     Decubitus ulcer of left buttock, stage 2 (H)        Relevant Labs:  Recent Labs   Lab Test 07/18/22  1455 07/09/19  1205   WBC 20.6* 7.4   HGB 11.8* 12.1*    218        CrCl: 77.2 mL/min    No intake or output data in the 24 hours ending 07/18/22 1821       Per Renal Dose Adjustment Protocol, will adjust:  -Pip/tazo to 3.375 g Q6H (indication of skin and soft tissue infection, normal renal function)      Will continue to follow and make adjustments accordingly. Thank You.    Faustino Chapman [facility] ....................  7/18/2022   6:21 PM

## 2022-07-18 NOTE — PHARMACY-VANCOMYCIN DOSING SERVICE
Pharmacy Vancomycin Initial Note  Date of Service 2022  Patient's  1945  76 year old, male    Indication: Skin and Soft Tissue Infection    Current estimated CrCl = CrCl cannot be calculated (Patient's most recent lab result is older than the maximum 30 days allowed.).    Creatinine for last 3 days  No results found for requested labs within last 72 hours.    Recent Vancomycin Level(s) for last 3 days  No results found for requested labs within last 72 hours.      Vancomycin IV Administrations (past 72 hours)      No vancomycin orders with administrations in past 72 hours.                Nephrotoxins and other renal medications (From now, onward)    Start     Dose/Rate Route Frequency Ordered Stop    22 1640  vancomycin (VANCOCIN) 1,500 mg in sodium chloride 0.9 % 500 mL intermittent infusion         1,500 mg  over 90 Minutes Intravenous ONCE 22 1636            Contrast Orders - past 72 hours (72h ago, onward)    None                Plan:  1. Give vancomycin  1,500 mg IV x1 dose (loading dose). Obtain serum creatinine level prior to subsequent dosing.  2. Vancomycin monitoring method: AUC  3. Vancomycin therapeutic monitoring goal: 400-600 mg*h/L  4. Pharmacy will check vancomycin levels as appropriate in 1-3 Days.    5. Serum creatinine levels will be ordered daily for the first week of therapy and at least twice weekly for subsequent weeks.      Faustino Chapman RP

## 2022-07-18 NOTE — ED TRIAGE NOTES
Pt here with staff from [facility] assisted living, pt has a wound on his left leg that the staff is more concerned about, they report increased drainage and swelling to area, pt denies any pain or any other complaints and is in no acute distress, pt out into waiting room to wait for ED room     Triage Assessment     Row Name 07/18/22 1243       Triage Assessment (Adult)    Airway WDL WDL       Respiratory WDL    Respiratory WDL WDL       Skin Circulation/Temperature WDL    Skin Circulation/Temperature WDL WDL       Cardiac WDL    Cardiac WDL WDL       Peripheral/Neurovascular WDL    Peripheral Neurovascular WDL WDL       Cognitive/Neuro/Behavioral WDL    Cognitive/Neuro/Behavioral WDL WDL    Row Name 07/18/22 1242       Triage Assessment (Adult)    Airway WDL WDL       Respiratory WDL    Respiratory WDL WDL       Skin Circulation/Temperature WDL    Skin Circulation/Temperature WDL WDL       Cardiac WDL    Cardiac WDL WDL       Peripheral/Neurovascular WDL    Peripheral Neurovascular WDL WDL       Cognitive/Neuro/Behavioral WDL    Cognitive/Neuro/Behavioral WDL WDL

## 2022-07-18 NOTE — H&P
GENERAL SURGERY CONSULTATION NOTE    Jimmy Marcus   [address] [facility] [address]  76 year old  male    Primary Care Provider:  No Ref-Primary, Physician      HPI: Jimmy Marcus presents to emergency department from his assisted living facility for increased drainage from decubitus wound on the left buttock.  Patient is unable to give any other appreciable history.     REVIEW OF SYSTEMS:     Unable to obtain        Patient Active Problem List   Diagnosis     Cellulitis of left buttock     Decubitus ulcer of left buttock, stage 2 (H)       No past medical history on file.    No past surgical history on file.    No family history on file.    Social History     Social History Narrative     Not on file       Social History     Socioeconomic History     Marital status: Single     Spouse name: Not on file     Number of children: Not on file     Years of education: Not on file     Highest education level: Not on file   Occupational History     Not on file   Tobacco Use     Smoking status: Never Smoker     Smokeless tobacco: Never Used   Substance and Sexual Activity     Alcohol use: Not Currently     Drug use: Never     Sexual activity: Not on file   Other Topics Concern     Not on file   Social History Narrative     Not on file     Social Determinants of Health     Financial Resource Strain: Not on file   Food Insecurity: Not on file   Transportation Needs: Not on file   Physical Activity: Not on file   Stress: Not on file   Social Connections: Not on file   Intimate Partner Violence: Not on file   Housing Stability: Not on file       No current facility-administered medications on file prior to encounter.  acetaminophen (TYLENOL) 325 MG tablet, Take 650 mg by mouth every 6 hours as needed for mild pain  alum & mag hydroxide-simethicone (MAALOX  ES) 400-400-40 MG/5ML SUSP suspension, Take 15 mLs by mouth  aspirin (ASA) 81 MG chewable tablet, Take 81 mg by mouth daily  bacitracin 500 UNIT/GM OINT,    cetirizine (ZYRTEC) 10 MG tablet,   chlorhexidine (PERIDEX) 0.12 % solution, Swish and spit 15 mLs in mouth 2 times daily  emollient (VANICREAM) external cream,   EPINEPHrine (SYMJEPI) 0.3 MG/0.3ML, Inject 1 Syringe into the muscle  FLUoxetine (PROZAC) 10 MG capsule, Take 20 mg by mouth daily Pt takes 30 mg daily, 10 and a 20 mg  hydrocortisone 2.5 % cream,   hydrOXYzine (ATARAX) 10 MG tablet,   latanoprost (XALATAN) 0.005 % ophthalmic solution, Place 1 drop into both eyes daily  OLANZapine (ZYPREXA) 2.5 MG tablet, Take 2 tablets (5 mg) by mouth At Bedtime (Patient taking differently: Take 10 mg by mouth At Bedtime)  polyethylene glycol (MIRALAX/GLYCOLAX) packet, Take 1 packet by mouth daily  predniSONE (DELTASONE) 20 MG tablet,   sucralfate (CARAFATE) 1 GM tablet, Take 1 g by mouth 4 times daily  acetaminophen (TYLENOL) 650 MG CR tablet, Take 650 mg by mouth          ALLERGIES/SENSITIVITIES:   Allergies   Allergen Reactions     Peanuts [Nuts] Anaphylaxis     Motrin [Ibuprofen]        PHYSICAL EXAM:     BP (!) 147/79   Pulse 92   Temp 98.2  F (36.8  C) (Tympanic)   Resp 16   Ht 1.829 m (6')   Wt 82.1 kg (181 lb)   SpO2 99%   BMI 24.55 kg/m      General Appearance:   Laying in bed, no apparent distress  HEENT: Pupils are equal and reactive, no scleral icterus,   Heart & CV:  RRR no murmur.  Intact distal pulses, good cap refill.  LUNGS: No increased work of breathing.Lugns are CTA B/L, no wheezing or crackles.  Abd: Soft, nontender, nondistended  Ext: On the left lateral buttock there is an approximately 3 cm x 3 cm denuded area of skin with gross fluctuance.  When palpated there is copious purulent fluid from the wound and air.  Neuro: Alert and oriented to person place or time.    Labs are reviewed and are significant for CRP 43.8, lactic acid 1.1, WBC 20.6, hemoglobin 11.8, platelet 341    COVID-negative        CONSULTATION ASSESSMENT AND PLAN:    76 year old male with necrotizing soft tissue infection of  the left buttock.  Patient will need emergent surgery for debridement and control the infection.  Patient will also require likely a lengthy hospital stay for postoperative wound cares.  Patient does not understand the situation and unclear if he is able to give consent for this type of procedure.  We are working on finding a medical decision-maker who can give consent.  If he cannot find one in a timely manner we will proceed to surgery as this, I believe, is an emergent situation.  I believe the infection can get much worse very quickly and at rest making the patient very very sick.  Patient is not currently septic.     N.p.o.  IV fluid resuscitation  Vancomycin and Zosyn  To operating room for emergent debridement of left buttock necrotizing wound        Adonay Palma MD on 7/18/2022 at 5:30 PM

## 2022-07-18 NOTE — ANESTHESIA PROCEDURE NOTES
Airway       Patient location during procedure: OR       Procedure Start/Stop Times: 7/18/2022 6:39 PM  Staff -        CRNA: Kellerman, David, APRN CRNA       Performed By: CRNA  Consent for Airway        Urgency: emergent  Indications and Patient Condition       Indications for airway management: dayo-procedural       Induction type:intravenous       Mask difficulty assessment: 0 - not attempted    Final Airway Details       Final airway type: endotracheal airway       Successful airway: ETT - single  Endotracheal Airway Details        ETT size (mm): 8.0       Cuffed: yes       Cuff volume (mL): 8       Successful intubation technique: direct laryngoscopy       DL Blade Type: MAC 4       Grade View of Cords: 2       Adjucts: stylet       Position: Right       Measured from: gums/teeth       Secured at (cm): 24       Bite block used: None    Post intubation assessment        Placement verified by: capnometry, equal breath sounds and chest rise        Number of attempts at approach: 1       Secured with: silk tape       Ease of procedure: easy       Dentition: Intact and Unchanged    Medication(s) Administered   Medication Administration Time: 7/18/2022 6:39 PM

## 2022-07-18 NOTE — ED PROVIDER NOTES
History     Chief Complaint   Patient presents with     Wound Check     HPI  Jimmy Marcus is a 76 year old male who.  Resides at [facility] assisted living he has a wound to his left buttocks and scapula this has been having increased draining and swelling.  They have noticed some surrounding erythema as well.  They are concerned for infection.  They report that he has not been ambulatory since he arrived there a year ago, and that he seems to be weaker since arriving.     Allergies:  Allergies   Allergen Reactions     Peanuts [Nuts] Anaphylaxis     Motrin [Ibuprofen]        Problem List:    There are no problems to display for this patient.       Past Medical History:    No past medical history on file.    Past Surgical History:    No past surgical history on file.    Family History:    No family history on file.    Social History:  Marital Status:  Single [1]  Social History     Tobacco Use     Smoking status: Never Smoker     Smokeless tobacco: Never Used   Substance Use Topics     Alcohol use: Not Currently     Drug use: Never        Medications:    acetaminophen (TYLENOL) 325 MG tablet  alum & mag hydroxide-simethicone (MAALOX  ES) 400-400-40 MG/5ML SUSP suspension  aspirin (ASA) 81 MG chewable tablet  bacitracin 500 UNIT/GM OINT  cetirizine (ZYRTEC) 10 MG tablet  chlorhexidine (PERIDEX) 0.12 % solution  emollient (VANICREAM) external cream  EPINEPHrine (SYMJEPI) 0.3 MG/0.3ML  FLUoxetine (PROZAC) 10 MG capsule  hydrocortisone 2.5 % cream  hydrOXYzine (ATARAX) 10 MG tablet  latanoprost (XALATAN) 0.005 % ophthalmic solution  OLANZapine (ZYPREXA) 2.5 MG tablet  polyethylene glycol (MIRALAX/GLYCOLAX) packet  predniSONE (DELTASONE) 20 MG tablet  sucralfate (CARAFATE) 1 GM tablet  acetaminophen (TYLENOL) 650 MG CR tablet          Review of Systems   Constitutional: Negative for fever.   HENT: Negative for drooling and facial swelling.    Eyes: Negative for discharge.   Respiratory: Negative for stridor.     Cardiovascular: Negative for chest pain.   Gastrointestinal: Negative for abdominal pain.   Genitourinary: Negative for flank pain.   Musculoskeletal: Negative for neck stiffness.        Left buttocks ulcer/wound with surrounding erythema   Skin: Negative for pallor.   Neurological: Negative for tremors, seizures and facial asymmetry.   Psychiatric/Behavioral: Negative for agitation and confusion.   All other systems reviewed and are negative.      Physical Exam   BP: (!) 157/82  Pulse: 79  Temp: 98.2  F (36.8  C)  Resp: 16  Height: 182.9 cm (6')  Weight: 82.1 kg (181 lb)  SpO2: 99 %      Physical Exam  Vitals and nursing note reviewed.   Constitutional:       General: He is not in acute distress.     Appearance: Normal appearance. He is not ill-appearing or toxic-appearing.   HENT:      Head: Normocephalic. No raccoon eyes, right periorbital erythema or left periorbital erythema.      Right Ear: No drainage or tenderness.      Left Ear: No drainage or tenderness.      Nose: Nose normal.   Eyes:      General: Lids are normal. Gaze aligned appropriately. No scleral icterus.     Extraocular Movements: Extraocular movements intact.   Neck:      Trachea: No tracheal deviation.   Cardiovascular:      Rate and Rhythm: Normal rate.   Pulmonary:      Effort: Pulmonary effort is normal. No respiratory distress.      Breath sounds: No stridor. No wheezing.   Abdominal:      Tenderness: There is no abdominal tenderness.   Musculoskeletal:      Cervical back: Normal range of motion. No signs of trauma.      Comments: Patient is nonambulatory   Skin:     General: Skin is warm and dry.      Coloration: Skin is not jaundiced or pale.      Comments: left lateral buttock there is an approximately 1.5 inch diameter denuded area of skin with gross fluctuance.   There is surrounding skin erythema and warmth.  When palpated there is copious purulent fluid from the wound.  There is a foul odor emanating from the wound.  Wound culture  was obtained and is pending   Neurological:      General: No focal deficit present.      Mental Status: He is alert and oriented to person, place, and time.      GCS: GCS eye subscore is 4. GCS verbal subscore is 5. GCS motor subscore is 6.      Motor: No tremor or seizure activity.   Psychiatric:         Attention and Perception: Attention normal.         Mood and Affect: Mood normal.         ED Course     Results for orders placed or performed during the hospital encounter of 07/18/22 (from the past 24 hour(s))   Skin Aerobic Bacterial Culture Routine with Gram Stain    Specimen: Buttock, Left; Skin   Result Value Ref Range    Gram Stain Result 1+ PMNs Seen (A)     Gram Stain Result 3+ Gram positive cocci (A)     Gram Stain Result 1+ Gram negative bacilli (A)    CBC with platelets differential    Narrative    The following orders were created for panel order CBC with platelets differential.  Procedure                               Abnormality         Status                     ---------                               -----------         ------                     CBC with platelets and d...[[id number]]  Abnormal            Final result                 Please view results for these tests on the individual orders.   Erythrocyte sedimentation rate auto   Result Value Ref Range    Erythrocyte Sedimentation Rate 20 0 - 20 mm/hr   CRP inflammation   Result Value Ref Range    CRP Inflammation 43.8 (H) <10.0 mg/L   Lactic acid whole blood   Result Value Ref Range    Lactic Acid 1.1 0.7 - 2.0 mmol/L   CBC with platelets and differential   Result Value Ref Range    WBC Count 20.6 (H) 4.0 - 11.0 10e3/uL    RBC Count 4.11 (L) 4.40 - 5.90 10e6/uL    Hemoglobin 11.8 (L) 13.3 - 17.7 g/dL    Hematocrit 35.9 (L) 40.0 - 53.0 %    MCV 87 78 - 100 fL    MCH 28.7 26.5 - 33.0 pg    MCHC 32.9 31.5 - 36.5 g/dL    RDW 13.7 10.0 - 15.0 %    Platelet Count 341 150 - 450 10e3/uL    % Neutrophils 93 %    % Lymphocytes 2 %    % Monocytes 4 %     % Eosinophils 0 %    % Basophils 0 %    % Immature Granulocytes 1 %    NRBCs per 100 WBC 0 <1 /100    Absolute Neutrophils 19.2 (H) 1.6 - 8.3 10e3/uL    Absolute Lymphocytes 0.5 (L) 0.8 - 5.3 10e3/uL    Absolute Monocytes 0.8 0.0 - 1.3 10e3/uL    Absolute Eosinophils 0.0 0.0 - 0.7 10e3/uL    Absolute Basophils 0.0 0.0 - 0.2 10e3/uL    Absolute Immature Granulocytes 0.2 <=0.4 10e3/uL    Absolute NRBCs 0.0 10e3/uL   Basic metabolic panel   Result Value Ref Range    Sodium 133 (L) 134 - 144 mmol/L    Potassium 4.1 3.5 - 5.1 mmol/L    Chloride 99 98 - 107 mmol/L    Carbon Dioxide (CO2) 26 21 - 31 mmol/L    Anion Gap 8 3 - 14 mmol/L    Urea Nitrogen 23 7 - 25 mg/dL    Creatinine 0.93 0.70 - 1.30 mg/dL    Calcium 9.1 8.6 - 10.3 mg/dL    Glucose 201 (H) 70 - 105 mg/dL    GFR Estimate 85 >60 mL/min/1.73m2   Asymptomatic Influenza A/B & SARS-CoV2 (COVID-19) Virus PCR Multiplex Nose    Specimen: Nose; Swab   Result Value Ref Range    Influenza A PCR Negative Negative    Influenza B PCR Negative Negative    RSV PCR Negative Negative    SARS CoV2 PCR Negative Negative    Narrative    Testing was performed using the Xpert Xpress CoV2/Flu/RSV Assay on the [employer] GeneXpert Instrument. This test should be ordered for the detection of SARS-CoV-2 and influenza viruses in individuals who meet clinical and/or epidemiological criteria. Test performance is unknown in asymptomatic patients. This test is for in vitro diagnostic use under the FDA EUA for laboratories certified under CLIA to perform high or moderate complexity testing. This test has not been FDA cleared or approved. A negative result does not rule out the presence of PCR inhibitors in the specimen or target RNA in concentration below the limit of detection for the assay. If only one viral target is positive but coinfection with multiple targets is suspected, the sample should be re-tested with another FDA cleared, approved, or authorized test, if coinfection would change  clinical management. This test was validated by the [facility]. These laboratories are certified under the Clinical  Laboratory Improvement Amendments of 1988 (CLIA-88) as qualified to perform high complexity laboratory testing.       Medications   piperacillin-tazobactam (ZOSYN) infusion 3.375 g ( Intravenous Unhold 7/18/22 2021)   alum & mag hydroxide-simethicone (MAALOX  ES) suspension 15 mL (has no administration in time range)   loratadine (CLARITIN) tablet 10 mg (has no administration in time range)   hydrOXYzine (ATARAX) tablet 10 mg (has no administration in time range)   latanoprost (XALATAN) 0.005 % ophthalmic solution 1 drop (has no administration in time range)   OLANZapine (zyPREXA) tablet 10 mg (has no administration in time range)   lidocaine 1 % 0.1-1 mL (has no administration in time range)   lidocaine (LMX4) cream (has no administration in time range)   sodium chloride (PF) 0.9% PF flush 3 mL (has no administration in time range)   sodium chloride (PF) 0.9% PF flush 3 mL (has no administration in time range)   acetaminophen (TYLENOL) tablet 975 mg (has no administration in time range)   acetaminophen (TYLENOL) tablet 650 mg (has no administration in time range)   ondansetron (ZOFRAN ODT) ODT tab 4 mg (has no administration in time range)     Or   ondansetron (ZOFRAN) injection 4 mg (has no administration in time range)   prochlorperazine (COMPAZINE) injection 5 mg (has no administration in time range)     Or   prochlorperazine (COMPAZINE) tablet 5 mg (has no administration in time range)   senna-docusate (SENOKOT-S/PERICOLACE) 8.6-50 MG per tablet 1 tablet (has no administration in time range)   polyethylene glycol (MIRALAX) Packet 17 g (has no administration in time range)   magnesium hydroxide (MILK OF MAGNESIA) suspension 30 mL (has no administration in time range)   bisacodyl (DULCOLAX) suppository 10 mg (has no administration in time range)   enoxaparin ANTICOAGULANT  (LOVENOX) injection 40 mg (has no administration in time range)   lactated ringers infusion (has no administration in time range)   HYDROmorphone (DILAUDID) injection 0.2 mg (has no administration in time range)     Or   HYDROmorphone (DILAUDID) injection 0.4 mg (has no administration in time range)   oxyCODONE (ROXICODONE) tablet 5 mg (has no administration in time range)     Or   oxyCODONE (ROXICODONE) tablet 10 mg (has no administration in time range)   naloxone (NARCAN) injection 0.2 mg (has no administration in time range)     Or   naloxone (NARCAN) injection 0.4 mg (has no administration in time range)     Or   naloxone (NARCAN) injection 0.2 mg (has no administration in time range)     Or   naloxone (NARCAN) injection 0.4 mg (has no administration in time range)   FLUoxetine (PROzac) capsule 30 mg (has no administration in time range)   vancomycin (VANCOCIN) 1,500 mg in sodium chloride 0.9 % 500 mL intermittent infusion (0 mg Intravenous ED Infusing on Admission/transfer 7/18/22 3359)       Assessments & Plan (with Medical Decision Making)     I have reviewed the nursing notes.    I have reviewed the findings, diagnosis, plan and need for follow up with the patient.       ED to Inpatient Handoff:    Discuss[facility] with Dr. Palma at [facility]  Patient accepted for Inpatient Stay  Pending studies include wound culture, blood cultures  Code Status: Not Addressed           Current Discharge Medication List          Final diagnoses:   Cellulitis of left buttock   Decubitus ulcer of left buttock, stage 2 (H)   Necrotizing soft tissue infection     Afebrile.  Vital signs stable.  Patient brought by [facility] for an evaluation due to a possible wound infection.   Patient has a decubitus ulcer grade 2 on examination with surrounding erythema.  There is a foul odor and purulent drainage.  Wound culture was obtained and is pending.  IV established and the patient was given fluids.  CBC shows elevated white  blood cells at 20.6 with left shift.  The patient's hemoglobin is 11.8.  Lactic acid is normal.   However CRP is elevated at 43.8 and ESR is normal at 20.  BMP shows a sodium of 133.  Blood cultures are pending.  Influenza, RSV and COVID tests are negative.  Wound culture returns with 1+ PMNs, 3+ gram-positive cocci, and 1+ gram-negative bacilli.  The patient was started on vancomycin under pharmacy traction for dosage.  I discussed this case with Dr. Palma and the patient will be admitted at this time for further medical management and evaluation with the anticipation of surgical debridement.  7/18/2022   [facility]     Dirk Harper PA-C  07/18/22 2129

## 2022-07-19 ENCOUNTER — ANESTHESIA (OUTPATIENT)
Dept: MEDSURG UNIT | Facility: OTHER | Age: 77
DRG: 264 | End: 2022-07-19
Payer: MEDICARE

## 2022-07-19 ENCOUNTER — ANESTHESIA EVENT (OUTPATIENT)
Dept: MEDSURG UNIT | Facility: OTHER | Age: 77
DRG: 264 | End: 2022-07-19
Payer: MEDICARE

## 2022-07-19 PROBLEM — I10 ESSENTIAL HYPERTENSION: Status: ACTIVE | Noted: 2022-07-19

## 2022-07-19 PROBLEM — E11.9 TYPE 2 DIABETES MELLITUS WITHOUT COMPLICATION, WITHOUT LONG-TERM CURRENT USE OF INSULIN (H): Status: ACTIVE | Noted: 2022-07-19

## 2022-07-19 PROBLEM — F02.818 LATE ONSET ALZHEIMER'S DEMENTIA WITH BEHAVIORAL DISTURBANCE (H): Status: ACTIVE | Noted: 2022-07-19

## 2022-07-19 PROBLEM — G30.1 LATE ONSET ALZHEIMER'S DEMENTIA WITH BEHAVIORAL DISTURBANCE (H): Status: ACTIVE | Noted: 2022-07-19

## 2022-07-19 LAB
ANION GAP SERPL CALCULATED.3IONS-SCNC: 6 MMOL/L (ref 3–14)
BUN SERPL-MCNC: 21 MG/DL (ref 7–25)
CALCIUM SERPL-MCNC: 8.7 MG/DL (ref 8.6–10.3)
CHLORIDE BLD-SCNC: 102 MMOL/L (ref 98–107)
CO2 SERPL-SCNC: 27 MMOL/L (ref 21–31)
CREAT SERPL-MCNC: 0.91 MG/DL (ref 0.7–1.3)
ERYTHROCYTE [DISTWIDTH] IN BLOOD BY AUTOMATED COUNT: 13.7 % (ref 10–15)
GFR SERPL CREATININE-BSD FRML MDRD: 87 ML/MIN/1.73M2
GLUCOSE BLD-MCNC: 111 MG/DL (ref 70–105)
GLUCOSE BLDC GLUCOMTR-MCNC: 110 MG/DL (ref 70–99)
GLUCOSE BLDC GLUCOMTR-MCNC: 99 MG/DL (ref 70–99)
HCT VFR BLD AUTO: 32.6 % (ref 40–53)
HGB BLD-MCNC: 10.6 G/DL (ref 13.3–17.7)
MCH RBC QN AUTO: 28.6 PG (ref 26.5–33)
MCHC RBC AUTO-ENTMCNC: 32.5 G/DL (ref 31.5–36.5)
MCV RBC AUTO: 88 FL (ref 78–100)
PLATELET # BLD AUTO: 315 10E3/UL (ref 150–450)
POTASSIUM BLD-SCNC: 4.1 MMOL/L (ref 3.5–5.1)
RBC # BLD AUTO: 3.71 10E6/UL (ref 4.4–5.9)
SODIUM SERPL-SCNC: 135 MMOL/L (ref 134–144)
WBC # BLD AUTO: 12.8 10E3/UL (ref 4–11)

## 2022-07-19 PROCEDURE — 99222 1ST HOSP IP/OBS MODERATE 55: CPT | Performed by: FAMILY MEDICINE

## 2022-07-19 PROCEDURE — 250N000011 HC RX IP 250 OP 636: Performed by: FAMILY MEDICINE

## 2022-07-19 PROCEDURE — 99231 SBSQ HOSP IP/OBS SF/LOW 25: CPT | Mod: 25 | Performed by: SURGERY

## 2022-07-19 PROCEDURE — 80048 BASIC METABOLIC PNL TOTAL CA: CPT | Performed by: SURGERY

## 2022-07-19 PROCEDURE — 250N000013 HC RX MED GY IP 250 OP 250 PS 637: Performed by: SURGERY

## 2022-07-19 PROCEDURE — 258N000003 HC RX IP 258 OP 636: Performed by: SURGERY

## 2022-07-19 PROCEDURE — 120N000001 HC R&B MED SURG/OB

## 2022-07-19 PROCEDURE — 36415 COLL VENOUS BLD VENIPUNCTURE: CPT | Performed by: SURGERY

## 2022-07-19 PROCEDURE — 85027 COMPLETE CBC AUTOMATED: CPT | Performed by: SURGERY

## 2022-07-19 PROCEDURE — 36410 VNPNXR 3YR/> PHY/QHP DX/THER: CPT | Performed by: NURSE ANESTHETIST, CERTIFIED REGISTERED

## 2022-07-19 PROCEDURE — 250N000011 HC RX IP 250 OP 636: Performed by: SURGERY

## 2022-07-19 PROCEDURE — 258N000003 HC RX IP 258 OP 636: Performed by: FAMILY MEDICINE

## 2022-07-19 RX ORDER — CHOLECALCIFEROL (VITAMIN D3) 50 MCG
1 TABLET ORAL 2 TIMES DAILY
COMMUNITY
End: 2022-01-01

## 2022-07-19 RX ORDER — OLANZAPINE 10 MG/1
10 TABLET ORAL DAILY
COMMUNITY
End: 2022-01-01

## 2022-07-19 RX ORDER — SODIUM CHLORIDE 9 MG/ML
INJECTION, SOLUTION INTRAVENOUS CONTINUOUS
Status: DISCONTINUED | OUTPATIENT
Start: 2022-07-19 | End: 2022-07-21

## 2022-07-19 RX ORDER — NYSTATIN 100000 [USP'U]/G
POWDER TOPICAL 2 TIMES DAILY PRN
COMMUNITY
End: 2022-01-01

## 2022-07-19 RX ORDER — CLINDAMYCIN PHOSPHATE 600 MG/50ML
600 INJECTION, SOLUTION INTRAVENOUS EVERY 8 HOURS
Status: DISCONTINUED | OUTPATIENT
Start: 2022-07-19 | End: 2022-07-21

## 2022-07-19 RX ORDER — MINERAL OIL/PETROLATUM,WHITE
OINTMENT (GRAM) TOPICAL DAILY PRN
COMMUNITY
End: 2022-01-01

## 2022-07-19 RX ORDER — LIDOCAINE 4 G/G
1 PATCH TOPICAL DAILY PRN
COMMUNITY
End: 2022-01-01

## 2022-07-19 RX ORDER — LOPERAMIDE HYDROCHLORIDE 2 MG/1
TABLET ORAL
COMMUNITY
End: 2022-01-01

## 2022-07-19 RX ADMIN — LORATADINE 10 MG: 10 TABLET ORAL at 09:52

## 2022-07-19 RX ADMIN — TAZOBACTAM SODIUM AND PIPERACILLIN SODIUM 4.5 G: 500; 4 INJECTION, SOLUTION INTRAVENOUS at 07:47

## 2022-07-19 RX ADMIN — TAZOBACTAM SODIUM AND PIPERACILLIN SODIUM 3.38 G: 375; 3 INJECTION, SOLUTION INTRAVENOUS at 02:19

## 2022-07-19 RX ADMIN — ACETAMINOPHEN 975 MG: 325 TABLET, FILM COATED ORAL at 22:57

## 2022-07-19 RX ADMIN — SODIUM CHLORIDE: 9 INJECTION, SOLUTION INTRAVENOUS at 09:51

## 2022-07-19 RX ADMIN — VANCOMYCIN HYDROCHLORIDE 1500 MG: 1 INJECTION, POWDER, LYOPHILIZED, FOR SOLUTION INTRAVENOUS at 11:01

## 2022-07-19 RX ADMIN — FLUOXETINE 30 MG: 20 CAPSULE ORAL at 09:53

## 2022-07-19 RX ADMIN — TAZOBACTAM SODIUM AND PIPERACILLIN SODIUM 4.5 G: 500; 4 INJECTION, SOLUTION INTRAVENOUS at 19:35

## 2022-07-19 RX ADMIN — ACETAMINOPHEN 975 MG: 325 TABLET, FILM COATED ORAL at 14:31

## 2022-07-19 RX ADMIN — CLINDAMYCIN PHOSPHATE 600 MG: 600 INJECTION, SOLUTION INTRAVENOUS at 18:20

## 2022-07-19 RX ADMIN — CLINDAMYCIN PHOSPHATE 600 MG: 600 INJECTION, SOLUTION INTRAVENOUS at 09:54

## 2022-07-19 RX ADMIN — TAZOBACTAM SODIUM AND PIPERACILLIN SODIUM 4.5 G: 500; 4 INJECTION, SOLUTION INTRAVENOUS at 14:32

## 2022-07-19 RX ADMIN — POLYETHYLENE GLYCOL 3350 17 G: 17 POWDER, FOR SOLUTION ORAL at 09:51

## 2022-07-19 RX ADMIN — OXYCODONE HYDROCHLORIDE 5 MG: 5 TABLET ORAL at 00:41

## 2022-07-19 RX ADMIN — LATANOPROST 1 DROP: 50 SOLUTION/ DROPS OPHTHALMIC at 22:57

## 2022-07-19 RX ADMIN — SENNOSIDES AND DOCUSATE SODIUM 1 TABLET: 50; 8.6 TABLET ORAL at 22:57

## 2022-07-19 RX ADMIN — SENNOSIDES AND DOCUSATE SODIUM 1 TABLET: 50; 8.6 TABLET ORAL at 09:52

## 2022-07-19 RX ADMIN — OLANZAPINE 10 MG: 2.5 TABLET ORAL at 22:57

## 2022-07-19 RX ADMIN — ENOXAPARIN SODIUM 40 MG: 40 INJECTION SUBCUTANEOUS at 14:32

## 2022-07-19 ASSESSMENT — ACTIVITIES OF DAILY LIVING (ADL)
DRESS: 1-->ASSISTANCE (EQUIPMENT/PERSON) NEEDED (NOT DEVELOPMENTALLY APPROPRIATE)
DRESSING/BATHING: DRESSING DIFFICULTY, ASSISTANCE 1 PERSON
DOING_ERRANDS_INDEPENDENTLY_DIFFICULTY: NO
ADLS_ACUITY_SCORE: 31
TRANSFERRING: 2-->COMPLETELY DEPENDENT (NOT DEVELOPMENTALLY APPROPRIATE)
TOILETING: 1-->ASSISTANCE (EQUIPMENT/PERSON) NEEDED (NOT DEVELOPMENTALLY APPROPRIATE)
FALL_HISTORY_WITHIN_LAST_SIX_MONTHS: NO
WEAR_GLASSES_OR_BLIND: NO
ADLS_ACUITY_SCORE: 35
ADLS_ACUITY_SCORE: 53
ADLS_ACUITY_SCORE: 33
TRANSFERRING: 2-->COMPLETELY DEPENDENT
ADLS_ACUITY_SCORE: 53
HEARING_DIFFICULTY_OR_DEAF: NO
DIFFICULTY_COMMUNICATING: NO
TOILETING: 1-->ASSISTANCE (EQUIPMENT/PERSON) NEEDED
ADLS_ACUITY_SCORE: 53
DRESS: 1-->ASSISTANCE (EQUIPMENT/PERSON) NEEDED
TOILETING_ASSISTANCE: TOILETING DIFFICULTY, ASSISTANCE 1 PERSON
DIFFICULTY_EATING/SWALLOWING: NO
ADLS_ACUITY_SCORE: 53
ADLS_ACUITY_SCORE: 45
DRESSING/BATHING_DIFFICULTY: YES
CHANGE_IN_FUNCTIONAL_STATUS_SINCE_ONSET_OF_CURRENT_ILLNESS/INJURY: NO
CONCENTRATING,_REMEMBERING_OR_MAKING_DECISIONS_DIFFICULTY: YES
WALKING_OR_CLIMBING_STAIRS_DIFFICULTY: YES
DRESSING/BATHING_MANAGEMENT: ASSIST OF ONE
EQUIPMENT_CURRENTLY_USED_AT_HOME: WHEELCHAIR, MANUAL
WALKING_OR_CLIMBING_STAIRS: TRANSFERRING DIFFICULTY, DEPENDENT
ADLS_ACUITY_SCORE: 31
ADLS_ACUITY_SCORE: 31
BATHING: 1-->ASSISTANCE NEEDED
ADLS_ACUITY_SCORE: 49
ADLS_ACUITY_SCORE: 53
TOILETING_ISSUES: YES

## 2022-07-19 NOTE — PROGRESS NOTES
:     [facility] in [place]. Spoke with RADHA Skelton and she stated that patient does not ambulate at the facility. She stated that patient does transfer as an assist X 1 but also uses the sit to stand.      will continue to follow for discharge planning needs.     BROOKLYN Steinberg on 7/19/2022 at 2:20 PM

## 2022-07-19 NOTE — CONSULTS
[facility]  Consult Note - Hospitalist Service  Date of Admission:  7/18/2022  Consult Requested by: Dr. Palma  Reason for Consult: Management of medical issues following surgery    Assessment & Plan   Jimmy Marcus is a 76 year old male admitted on 7/18/2022. He presented from assisted living, memory care units with increased size and redness involving a left buttock wound.  Patient has underlying Alzheimer's dementia and is unable to give any additional history to Indicate how long the wound has been present.  In the ER concerns were an infected wound with a white count elevated 20,600, elevated CRP with fluctuance.  Gram stain of the fluid indicating 3+ gram-positive cocci with 1+ gram-negative bacilli.  General surgery was consulted who was concerned of worsening infection and he was brought to surgery for emergent incision and drainage.  In the OR wound was noted to be necrotizing and the area was opened, now measuring 11 x 7 cm.  Wound is open to the level of the muscles overlying the ischial tuberosity.  Patient has been admitted and will be treated with broad-spectrum antibiotics, currently on Zosyn and Vanco and with the advice of pharmacy will add clindamycin.  Cultures of the fluid is pending.  Patient has known history of diabetes, not currently being treated and will recommend following sugars and covering with insulin if necessary.  An attempt was made to contact family with no one answering the phone.  We will continue to follow along.    Decubitus ulcer of left buttock, stage 2 (H)  Presenting from assisted living with ulcer overlying the left buttock area, increasing in size with increased redness  Evaluation in the ED concerning for an invasive infection and he was brought to the OR for incision and drainage emergently by general surgery  Wound described as necrotizing with open area now measuring 11 cm x 7 cm.  Culture fluid positive for gram-positive cocci and  gram-negative rods and currently on Zosyn and vancomycin with cultures pending  Today with improved white count, no fever.  Pharmacy has reviewed and is recommending addition of clindamycin for treatment of the necrotizing infection  Continue broad-spectrum antibiotics, add clindamycin, continue IV fluids  General surgery will continue to manage primarily and we will follow.    Necrotizing soft tissue infection  Noted on surgery with debridement of wound  As above, broad-spectrum antibiotics currently on Zosyn, Vanco and clindamycin with cultures pending    Essential hypertension  Taking lisinopril at home  We will plan to restart, follow pressures    Late onset Alzheimer's dementia with behavioral disturbance (H)  Currently living in an assisted living, memory care unit  Unable to give any additional history poor insight  Restart home meds including Zyprexa and Prozac    Type 2 diabetes mellitus without complication, without long-term current use of insulin (H)  Noted previously in chart, currently not on any diabetic meds  Will plan to track sugars, if elevated, may need sliding scale coverage         The patient's care was discussed with the Bedside Nurse.    Matthew Jasso MD  [facility]  Securely message with the Vocera Web Console (learn more here)  Text page via [facility] Paging/Directory       Hospitalist Service    Clinically Significant Risk Factors Present on Admission                    # Overweight: Estimated body mass index is 27.29 kg/m  as calculated from the following:    Height as of this encounter: 1.829 m (6').    Weight as of this encounter: 91.3 kg (201 lb 4 oz).        ______________________________________________________________________    Chief Complaint   76-year-old male with dementia presenting with wound overlying left buttock    History is obtained from the electronic health record and emergency department physician    History of Present Illness   Jimmy GREWAL  Amrit is a 76 year old male who presented to the ER with infected wound involving the left buttock area.  Patient has Alzheimer's dementia, living in an assisted living/memory care unit in [facility].  Notes indicate that staff are concerned that the wound to become more red and larger.  There is no additional information as to the timeframe or other causes.  Review of the chart indicates previous history of diabetes, not currently on insulin or other treatments, history of hypertension, history of coronary artery disease with only meds at this point being lisinopril presumably for high blood pressure and taking Zyprexa at bedtime.  Patient was brought emergently to surgery last evening, currently sleeping, awakening to voice but not answering questions.    Review of Systems   Review of systems not obtained due to patient factors - sedation    Past Medical History    I have reviewed this patient's medical history and updated it with pertinent information if needed.   History reviewed. No pertinent past medical history.    Past Surgical History   I have reviewed this patient's surgical history and updated it with pertinent information if needed.  History reviewed. No pertinent surgical history.    Social History   I have reviewed this patient's social history and updated it with pertinent information if needed.  Social History     Tobacco Use     Smoking status: Never Smoker     Smokeless tobacco: Never Used   Substance Use Topics     Alcohol use: Not Currently     Drug use: Never       Family History   Unable to obtain due to: Sedation and history of dementia    Medications   Medications Prior to Admission   Medication Sig Dispense Refill Last Dose     acetaminophen (TYLENOL) 325 MG tablet Take 650 mg by mouth every 6 hours as needed for mild pain   7/17/2022 at Unknown time     alum & mag hydroxide-simethicone (MAALOX  ES) 400-400-40 MG/5ML SUSP suspension Take 15 mLs by mouth   7/17/2022 at Unknown time      aspirin (ASA) 81 MG chewable tablet Take 81 mg by mouth daily   7/18/2022 at Unknown time     bacitracin 500 UNIT/GM OINT    7/17/2022 at Unknown time     cetirizine (ZYRTEC) 10 MG tablet    7/17/2022 at Unknown time     chlorhexidine (PERIDEX) 0.12 % solution Swish and spit 15 mLs in mouth 2 times daily   7/17/2022 at Unknown time     emollient (VANICREAM) external cream    7/17/2022 at Unknown time     EPINEPHrine (SYMJEPI) 0.3 MG/0.3ML Inject 1 Syringe into the muscle   More than a month at Unknown time     FLUoxetine (PROZAC) 10 MG capsule Take 20 mg by mouth daily Pt takes 30 mg daily, 10 and a 20 mg   7/18/2022 at Unknown time     hydrocortisone 2.5 % cream    7/17/2022 at Unknown time     hydrOXYzine (ATARAX) 10 MG tablet    Past Week at Unknown time     latanoprost (XALATAN) 0.005 % ophthalmic solution Place 1 drop into both eyes daily   7/17/2022 at Unknown time     OLANZapine (ZYPREXA) 2.5 MG tablet Take 2 tablets (5 mg) by mouth At Bedtime (Patient taking differently: Take 10 mg by mouth At Bedtime) 30 tablet 0 7/17/2022 at Unknown time     polyethylene glycol (MIRALAX/GLYCOLAX) packet Take 1 packet by mouth daily   7/17/2022 at Unknown time     predniSONE (DELTASONE) 20 MG tablet    7/17/2022 at Unknown time     sucralfate (CARAFATE) 1 GM tablet Take 1 g by mouth 4 times daily   7/18/2022 at Unknown time     acetaminophen (TYLENOL) 650 MG CR tablet Take 650 mg by mouth          Allergies   Allergies   Allergen Reactions     Peanuts [Nuts] Anaphylaxis     Motrin [Ibuprofen]        Physical Exam   Vital Signs: Temp: 97  F (36.1  C) Temp src: Tympanic BP: (!) 164/93 Pulse: 67   Resp: 14 SpO2: 99 % O2 Device: None (Room air) Oxygen Delivery: 8 LPM  Weight: 201 lbs 4 oz    Constitutional: Sleeping, awakens to voice but not answering questions, appears in no distress  Eyes: Pupils are equal and reactive, nonicteric  ENT: Mouth nose and throat normal  Respiratory: Lungs are clear  Cardiovascular: regular rate  and rhythm  GI: normal bowel sounds, soft and non-distended  Skin: Surgical dressings overlying the left buttock area  Musculoskeletal: No deformity lower extremities, no redness extending beyond the dressing    Data   Results for orders placed or performed during the hospital encounter of 07/18/22   Erythrocyte sedimentation rate auto     Status: Normal   Result Value Ref Range    Erythrocyte Sedimentation Rate 20 0 - 20 mm/hr   CRP inflammation     Status: Abnormal   Result Value Ref Range    CRP Inflammation 43.8 (H) <10.0 mg/L   Lactic acid whole blood     Status: Normal   Result Value Ref Range    Lactic Acid 1.1 0.7 - 2.0 mmol/L   Asymptomatic Influenza A/B & SARS-CoV2 (COVID-19) Virus PCR Multiplex Nose     Status: Normal    Specimen: Nose; Swab   Result Value Ref Range    Influenza A PCR Negative Negative    Influenza B PCR Negative Negative    RSV PCR Negative Negative    SARS CoV2 PCR Negative Negative    Narrative    Testing was performed using the Xpert Xpress CoV2/Flu/RSV Assay on the [employer] GeneXpert Instrument. This test should be ordered for the detection of SARS-CoV-2 and influenza viruses in individuals who meet clinical and/or epidemiological criteria. Test performance is unknown in asymptomatic patients. This test is for in vitro diagnostic use under the FDA EUA for laboratories certified under CLIA to perform high or moderate complexity testing. This test has not been FDA cleared or approved. A negative result does not rule out the presence of PCR inhibitors in the specimen or target RNA in concentration below the limit of detection for the assay. If only one viral target is positive but coinfection with multiple targets is suspected, the sample should be re-tested with another FDA cleared, approved, or authorized test, if coinfection would change clinical management. This test was validated by the [facility]. These laboratories are certified under the Clinical  Laboratory  Improvement Amendments of 1988 (CLIA-88) as qualified to perform high complexity laboratory testing.   CBC with platelets and differential     Status: Abnormal   Result Value Ref Range    WBC Count 20.6 (H) 4.0 - 11.0 10e3/uL    RBC Count 4.11 (L) 4.40 - 5.90 10e6/uL    Hemoglobin 11.8 (L) 13.3 - 17.7 g/dL    Hematocrit 35.9 (L) 40.0 - 53.0 %    MCV 87 78 - 100 fL    MCH 28.7 26.5 - 33.0 pg    MCHC 32.9 31.5 - 36.5 g/dL    RDW 13.7 10.0 - 15.0 %    Platelet Count 341 150 - 450 10e3/uL    % Neutrophils 93 %    % Lymphocytes 2 %    % Monocytes 4 %    % Eosinophils 0 %    % Basophils 0 %    % Immature Granulocytes 1 %    NRBCs per 100 WBC 0 <1 /100    Absolute Neutrophils 19.2 (H) 1.6 - 8.3 10e3/uL    Absolute Lymphocytes 0.5 (L) 0.8 - 5.3 10e3/uL    Absolute Monocytes 0.8 0.0 - 1.3 10e3/uL    Absolute Eosinophils 0.0 0.0 - 0.7 10e3/uL    Absolute Basophils 0.0 0.0 - 0.2 10e3/uL    Absolute Immature Granulocytes 0.2 <=0.4 10e3/uL    Absolute NRBCs 0.0 10e3/uL   Basic metabolic panel     Status: Abnormal   Result Value Ref Range    Sodium 133 (L) 134 - 144 mmol/L    Potassium 4.1 3.5 - 5.1 mmol/L    Chloride 99 98 - 107 mmol/L    Carbon Dioxide (CO2) 26 21 - 31 mmol/L    Anion Gap 8 3 - 14 mmol/L    Urea Nitrogen 23 7 - 25 mg/dL    Creatinine 0.93 0.70 - 1.30 mg/dL    Calcium 9.1 8.6 - 10.3 mg/dL    Glucose 201 (H) 70 - 105 mg/dL    GFR Estimate 85 >60 mL/min/1.73m2   Basic metabolic panel     Status: Abnormal   Result Value Ref Range    Sodium 135 134 - 144 mmol/L    Potassium 4.1 3.5 - 5.1 mmol/L    Chloride 102 98 - 107 mmol/L    Carbon Dioxide (CO2) 27 21 - 31 mmol/L    Anion Gap 6 3 - 14 mmol/L    Urea Nitrogen 21 7 - 25 mg/dL    Creatinine 0.91 0.70 - 1.30 mg/dL    Calcium 8.7 8.6 - 10.3 mg/dL    Glucose 111 (H) 70 - 105 mg/dL    GFR Estimate 87 >60 mL/min/1.73m2   CBC with platelets     Status: Abnormal   Result Value Ref Range    WBC Count 12.8 (H) 4.0 - 11.0 10e3/uL    RBC Count 3.71 (L) 4.40 - 5.90 10e6/uL     Hemoglobin 10.6 (L) 13.3 - 17.7 g/dL    Hematocrit 32.6 (L) 40.0 - 53.0 %    MCV 88 78 - 100 fL    MCH 28.6 26.5 - 33.0 pg    MCHC 32.5 31.5 - 36.5 g/dL    RDW 13.7 10.0 - 15.0 %    Platelet Count 315 150 - 450 10e3/uL   Glucose by meter     Status: Normal   Result Value Ref Range    GLUCOSE BY METER POCT 99 70 - 99 mg/dL   Glucose by meter     Status: Abnormal   Result Value Ref Range    GLUCOSE BY METER POCT 110 (H) 70 - 99 mg/dL   Blood Culture Peripheral Blood     Status: Normal (Preliminary result)    Specimen: Peripheral Blood   Result Value Ref Range    Culture No growth after 12 hours    Blood Culture Arm, Right     Status: Normal (Preliminary result)    Specimen: Arm, Right; Blood   Result Value Ref Range    Culture No growth after 12 hours    Skin Aerobic Bacterial Culture Routine with Gram Stain     Status: Abnormal (Preliminary result)    Specimen: Buttock, Left; Skin   Result Value Ref Range    Gram Stain Result 1+ PMNs Seen (A)     Gram Stain Result 3+ Gram positive cocci (A)     Gram Stain Result 1+ Gram negative bacilli (A)    CBC with platelets differential     Status: Abnormal    Narrative    The following orders were created for panel order CBC with platelets differential.  Procedure                               Abnormality         Status                     ---------                               -----------         ------                     CBC with platelets and d...[[id number]]  Abnormal            Final result                 Please view results for these tests on the individual orders.

## 2022-07-19 NOTE — ANESTHESIA CARE TRANSFER NOTE
Patient: Jimmy Marcus    Procedure: Procedure(s):  irrigation and debridement left buttock       Diagnosis: Necrotizing soft tissue infection [M79.89]  Decubitus ulcer of left buttock, stage 2 (H) [L89.322]  Diagnosis Additional Information: No value filed.    Anesthesia Type:   General     Note:      Level of Consciousness: drowsy  Oxygen Supplementation: face mask  Level of Supplemental Oxygen (L/min / FiO2): 8 L/min  Independent Airway: airway patency satisfactory and stable  Dentition: dentition unchanged  Vital Signs Stable: post-procedure vital signs reviewed and stable  Report to RN Given: handoff report given  Patient transferred to: PACU    Handoff Report: Identifed the Patient, Identified the Reponsible Provider, Reviewed the pertinent medical history, Discussed the surgical course, Reviewed Intra-OP anesthesia mangement and issues during anesthesia, Set expectations for post-procedure period and Allowed opportunity for questions and acknowledgement of understanding      Vitals:  Vitals Value Taken Time   /94 07/18/22 1923   Temp     Pulse 89 07/18/22 1925   Resp 16 07/18/22 1925   SpO2 100 % 07/18/22 1925   Vitals shown include unvalidated device data.    Electronically Signed By: David Kellerman, APRN CRNA  July 18, 2022  7:26 PM

## 2022-07-19 NOTE — PHARMACY-VANCOMYCIN DOSING SERVICE
"Pharmacy Vancomycin Initial Note  Date of Service 2022  Patient's  1945  76 year old, male    Indication: Skin and Soft Tissue Infection (necrotizing)    Current estimated CrCl = Estimated Creatinine Clearance: 87.8 mL/min (based on SCr of 0.91 mg/dL).    Creatinine for last 3 days  2022:  2:55 PM Creatinine 0.93 mg/dL  2022:  5:50 AM Creatinine 0.91 mg/dL    Recent Vancomycin Level(s) for last 3 days  No results found for requested labs within last 72 hours.      Vancomycin IV Administrations (past 72 hours)                   vancomycin (VANCOCIN) 1,500 mg in sodium chloride 0.9 % 500 mL intermittent infusion (mg) 1,500 mg New Bag 22 1708                Nephrotoxins and other renal medications (From now, onward)    Start     Dose/Rate Route Frequency Ordered Stop    22 1000  vancomycin (VANCOCIN) 1,500 mg in sodium chloride 0.9 % 500 mL intermittent infusion         1,500 mg  over 90 Minutes Intravenous EVERY 18 HOURS 22 0720      22 0800  piperacillin-tazobactam (ZOSYN) intermittent infusion 4.5 g        Note to Pharmacy: For [facility], [facility] and [facility]: For Zosyn-naive patients, use the \"Zosyn initial dose + extended infusion\" order panel.    4.5 g  200 mL/hr over 30 Minutes Intravenous EVERY 6 HOURS 22 0714            Contrast Orders - past 72 hours (72h ago, onward)    None          Ins[employer] Prediction of Planned Initial Vancomycin Regimen  Loading dose: N/A  Regimen: 1500 mg IV every 18 hours.  Start time: 08:17 on 2022  Exposure target: AUC24 (range)400-600 mg/L.hr   AUC24,ss: 552 mg/L.hr  Probability of AUC24 > 400: 83 %  Ctrough,ss: 16.8 mg/L  Probability of Ctrough,ss > 20: 34 %  Probability of nephrotoxicity (Lodise NELLIE ): 12 %          Plan:  1. Continue vancomycin  1500 mg IV q18h.   2. Vancomycin monitoring method: AUC  3. Vancomycin therapeutic monitoring goal: 400-600 mg*h/L  4. Pharmacy will check vancomycin levels as appropriate in 1-3 Days. "    5. Serum creatinine levels will be ordered daily for the first week of therapy and at least twice weekly for subsequent weeks.      Trish More RPH

## 2022-07-19 NOTE — PHARMACY-CONSULT NOTE
Pharmacy- Renal Dose Adjustment    Patient Active Problem List   Diagnosis     Cellulitis of left buttock     Decubitus ulcer of left buttock, stage 2 (H)     Necrotizing soft tissue infection        Relevant Labs:  Recent Labs   Lab Test 07/19/22  0550 07/18/22  1455   WBC 12.8* 20.6*   HGB 10.6* 11.8*    341        CrCl: 87.8 mL/min    No intake or output data in the 24 hours ending 07/19/22 0722       Per Renal Dose Adjustment Protocol, will adjust:  Zosyn to 4.5 gm per renal function/weight/original dose ordered by MD.      Will continue to follow and make adjustments accordingly. Thank You.    Trish More [facility] ....................  7/19/2022   7:22 AM

## 2022-07-19 NOTE — PHARMACY-ADMISSION MEDICATION HISTORY
Pharmacy -- Admission Medication Reconciliation    Prior to admission (PTA) medications were reviewed and the patient's PTA medication list was updated.    Sources Consulted: [employer]- [place] [facility], [facility]    The reliability of this Medication Reconciliation is: Reliability: Reliable    The following significant changes were made:  -Added Olanzapine 10 mg  -Added Epsom salt  -Added Fluoxetine 20 mg (pt taking 20 mg + 10 mg = 30 mg/day)  -Added Vitamin D  -Added Absorbase ointment  -Added Lidocaine patch  -Added Nystatin powder  -Added Loperamide  -Added Magnesium hydroxide    -Removed Acetaminophen CR  -Removed Chlorhexidine  -Removed Olanzapine 2.5 mg    -Changed Acetaminophen directions  -Changed Aspirin tab formulation (EC tab)  -Changed Sucralfate directions  -Provided directions for Prednisone  -Provided directions for Bacitracin  -Completed Maalox directions, changed dose  -Completed Epinephrine directions  -Completed Hydrocortisone cream directions  -Updated Fluoxetine 10 mg directions  -Provided directions for Hydroxyzine  -Updated Latanoprost administration time to bedtime  -Provided directions for Cetirizine      **Notes:**  -Prednisone: patient started on 10-day Prednisone taper 7/13 for gout. Took 40 mg/day from 7/13-7/17, scheduled to take 20 mg 7/18-7/22. Received first 20 mg dose yesterday, per Nafisa.    In addition, the patient's allergies were reviewed and left as follows:   Allergies: Peanuts [nuts] and Motrin [ibuprofen]       Medication barriers identified: none noted, facility staff administers   Medication adherence concerns: none noted, facility staff administers   Understanding of emergency medications: N/A- use to be determined by facility staff.    Faustino hCapman [facility], 7/19/2022,  9:59 AM

## 2022-07-19 NOTE — PLAN OF CARE
Patient admitted with cellulitis of left buttock; DAREN, Dressings clean, dry, & intact; Patient is alert, yet confused; 2 IV's removed within a couple of hours of coming to the floor; Anesthesia placed new IV; 0-6/10 pain ratings; denies nausea; VS stable.     Goal Outcome Evaluation:  Problem: Plan of Care - These are the overarching goals to be used throughout the patient stay.    Goal: Absence of Hospital-Acquired Illness or Injury  Intervention: Prevent Skin Injury  Recent Flowsheet Documentation  Taken 7/18/2022 2045 by Jessica Wilhelm RN  Body Position:   supine, head elevated   weight shifting  Taken 7/18/2022 2010 by Jessica Wilhelm RN  Body Position: supine, head elevated  Intervention: Prevent and Manage VTE (Venous Thromboembolism) Risk  Recent Flowsheet Documentation  Taken 7/19/2022 0041 by Jessica Wilhelm RN  Activity Management: activity adjusted per tolerance  Taken 7/18/2022 2310 by Jessica Wilhelm RN  Activity Management: activity adjusted per tolerance  Taken 7/18/2022 2115 by Jessica Wilhelm RN  Activity Management: activity adjusted per tolerance  Taken 7/18/2022 2045 by Jessica Wilhelm RN  Activity Management: activity adjusted per tolerance  Goal: Optimal Comfort and Wellbeing  Intervention: Monitor Pain and Promote Comfort  Recent Flowsheet Documentation  Taken 7/19/2022 0041 by Jessica Wilhelm RN  Pain Management Interventions: medication (see MAR)  Taken 7/18/2022 2310 by Jessica Wilhelm RN  Pain Management Interventions: cold applied  Taken 7/18/2022 2200 by Jessica Wilhelm RN  Pain Management Interventions: declines  Taken 7/18/2022 2010 by Jessica Wilhelm RN  Pain Management Interventions: cold applied  Goal: Readiness for Transition of Care  Intervention: Mutually Develop Transition Plan  Recent Flowsheet Documentation  Taken 7/18/2022 2010 by Jessica Wilhelm RN  Equipment Currently Used at Home: wheelchair, manual

## 2022-07-19 NOTE — ASSESSMENT & PLAN NOTE
Noted on surgery with debridement of wound  As above, broad-spectrum antibiotics currently on Zosyn, Vanco and clindamycin with cultures pending  Cultures pos for e. Coli and staph, . Will stop vanco. Per pharmacy when better could move to oral cefalosporin, question ceftin

## 2022-07-19 NOTE — PROGRESS NOTES
Admission Note    Data:  Jimmy Marcus admitted to 312 from surgery at 2010.      Action:  Dr. Palma has been notified of admission. Pt oriented to unit, call light in reach.     Response:  Patient tolerated transfer well .

## 2022-07-19 NOTE — ANESTHESIA POSTPROCEDURE EVALUATION
Patient: Jimmy Marcus    Procedure: Procedure(s):  irrigation and debridement left buttock       Anesthesia Type:  General    Note:  Disposition: Admission   Postop Pain Control:            Sign Out: Well controlled pain   PONV: No   Neuro/Psych:             Sign Out: Acceptable/Baseline neuro status   Airway/Respiratory:             Sign Out: Acceptable/Baseline resp. status   CV/Hemodynamics:             Sign Out: Acceptable CV status   Other NRE: NONE   DID A NON-ROUTINE EVENT OCCUR? No           Last vitals:  Vitals Value Taken Time   /94 07/18/22 1940   Temp 97.4  F (36.3  C) 07/18/22 1940   Pulse 89 07/18/22 1945   Resp 15 07/18/22 1945   SpO2 98 % 07/18/22 1957   Vitals shown include unvalidated device data.    Electronically Signed By: David Kellerman, APRN CRNA  July 18, 2022  8:00 PM

## 2022-07-19 NOTE — PROVIDER NOTIFICATION
07/19/22 0034   Valuables   Patient Belongings remains with patient   Patient Belongings Remaining with Patient shoes;clothing   Did you bring any home meds/supplements to the hospital?  No   A               Admission:  I am responsible for any personal items that are not sent to the safe or pharmacy.  [facility] is not responsible for loss, theft or damage of any property in my possession.    Signature:  _________________________________ Date: _______  Time: _____                                              Staff Signature:  ____________________________ Date: ________  Time: _____      2nd Staff person, if patient is unable/unwilling to sign:    Signature: ________________________________ Date: ________  Time: _____     Discharge:  [facility] has returned all of my personal belongings:    Signature: _________________________________ Date: ________  Time: _____                                          Staff Signature:  ____________________________ Date: ________  Time: _____

## 2022-07-19 NOTE — ASSESSMENT & PLAN NOTE
Noted previously in chart, currently not on any diabetic meds  Will plan to track sugars, if elevated, may need sliding scale coverage  7/21/2022-sugars have remained low, will follow

## 2022-07-19 NOTE — ANESTHESIA CARE TRANSFER NOTE
Patient: Jimmy Marcus    Procedure: * No procedures listed *       Diagnosis: * No pre-op diagnosis entered *  Diagnosis Additional Information: No value filed.    Anesthesia Type:   No value filed.     Note:      Level of Consciousness: drowsy  Oxygen Supplementation: room air    Independent Airway: airway patency satisfactory and stable    Vital Signs Stable: post-procedure vital signs reviewed and stable  Report to RN Given: handoff report given  Patient transferred to: Medical/Surgical Unit    Handoff Report: Identifed the Patient, Identified the Reponsible Provider, Reviewed the pertinent medical history, Discussed the surgical course, Reviewed Intra-OP anesthesia mangement and issues during anesthesia, Set expectations for post-procedure period and Allowed opportunity for questions and acknowledgement of understanding      Vitals:  Vitals Value Taken Time   BP     Temp     Pulse     Resp     SpO2         Electronically Signed By: David Kellerman, APRN CRNA  July 18, 2022  11:48 PM

## 2022-07-19 NOTE — OP NOTE
PREOPERATIVE  DIAGNOSIS: necrotizing soft tissue infection, decubitus ulcer, Decubital Gangrene       POSTOPERATIVE DIAGNOSIS: same    PROCEDURE PERFORMED:  Incision and excisional debridement of left buttock necrotizing soft tissue wound      SURGEON:  Adonay Palma MD     ASSISTANT: Circulator: Antonio Nichols RN  Scrub Person: Leora Liu Assistant: Tae Tamayo RN    ANESTHESIA: GeneralNo anesthesia staff entered.    FAMILYPHYSICIAN: No Ref-Primary, Physician      INDICATION FOR THE PROCEDURE:  The patient is a 76 year old y.o. male with a necrotizing soft tissue infection of the left buttock.         PROCEDURE:  Jimmy Marcus was brought to the operating room placed in supine position. General anesthetic was applied and the patient was intubated. He ws placed in the left lateral decubitus position.  Patient was prepped and draped in usual sterile fashion with betadine.  Timeout was performed and everyone was in agreement to proceed.  The wound on the left buttock was debrided by initially unroofing the shallow decubitus wound. The entire area was completely necrotic. This was cut back to healthy, bleeding tissue with cautery and scalpel.  The deepest structure that is debrided is fascia.  Some skin was taken on each side as it did not appear viable. Hemostasis was achieved with cautery. The final dimensions of the wound are 11 x 7 x 1.5 cm. The wound does not track anywhere. The wound was copiously irrigated with betadine tinged saline. The wound is now down to muscle overlying the ischial tuberosity. The wound was packed with betadine-tinged kerlex and covered with clean gauze. Cultures were not taken as they were taken previously.   At the end of the case all sponge and needle counts were correct.  The patient tolerated procedure well. They were extubated in the OR and was taken to the recovery room in good condition.        ANTONIA Palma MD

## 2022-07-19 NOTE — PLAN OF CARE
Patient turned and repositioned every two hours. Vital signs stable. Dressing intact on the patients coccyx. Patient has a man wick in place. Patient is fed at meals. Takes food and fluids with no problem, with assist.  Problem: Plan of Care - These are the overarching goals to be used throughout the patient stay.    Goal: Absence of Hospital-Acquired Illness or Injury  Intervention: Identify and Manage Fall Risk  Recent Flowsheet Documentation  Taken 7/19/2022 1503 by Kellen Encarnacion, RN  Safety Promotion/Fall Prevention: safety round/check completed  Intervention: Prevent and Manage VTE (Venous Thromboembolism) Risk  Recent Flowsheet Documentation  Taken 7/19/2022 1503 by Kellen Encarnacion, RN  VTE Prevention/Management: SCDs (sequential compression devices) on  Taken 7/19/2022 1427 by Kellen Encarnacion, RN  Activity Management: activity adjusted per tolerance  Goal: Readiness for Transition of Care  Intervention: Mutually Develop Transition Plan  Recent Flowsheet Documentation  Taken 7/19/2022 1300 by Kellen Encarnacion, RN  Equipment Currently Used at Home: wheelchair, manual     Problem: Pain Acute  Goal: Acceptable Pain Control and Functional Ability  Intervention: Prevent or Manage Pain  Recent Flowsheet Documentation  Taken 7/19/2022 1503 by Kellen Encarnacion, RN  Medication Review/Management: medications reviewed   Goal Outcome Evaluation:

## 2022-07-19 NOTE — PROGRESS NOTES
SAFETY CHECKLIST  ID Bands and Risk clasps correct and in place (DNR, Fall risk, Allergy, Latex, Limb):  Yes  All Lines Reconciled and labeled correctly: Yes  Whiteboard updated:Yes  Environmental interventions (bed/chair alarm on, call light, side rails, restraints, sitter....): Yes  Verify Tele #: no tele at this time

## 2022-07-19 NOTE — ASSESSMENT & PLAN NOTE
Presenting from assisted living with ulcer overlying the left buttock area, increasing in size with increased redness  Evaluation in the ED concerning for an invasive infection and he was brought to the OR for incision and drainage emergently by general surgery  Wound described as necrotizing with open area now measuring 11 cm x 7 cm.  Culture fluid positive for gram-positive cocci and gram-negative rods and currently on Zosyn and vancomycin with cultures pending  Today with improved white count, no fever.  Pharmacy has reviewed and is recommending addition of clindamycin for treatment of the necrotizing infection  Continue broad-spectrum antibiotics, add clindamycin, continue IV fluids  General surgery will continue to manage primarily and we will follow.  7/20/2022-clinically stable with inflammatory markers and vitals all normalizing. Plan for now continue current abx pending cultures. Gen surg planning for wound vac at discharge  7/21/2022-Wound clean per nursing. Gen surg waiting for granulation tissue and planning for wound vac placement early next week, ?Monday. AL not able to manage wound vac, so will SNF for a period to manage

## 2022-07-19 NOTE — ANESTHESIA POSTPROCEDURE EVALUATION
Patient: Jimmy Marcus    Procedure: * No procedures listed *       Anesthesia Type:  No value filed.    Note:  Disposition: Inpatient   Postop Pain Control:            Sign Out: Well controlled pain   PONV: No   Neuro/Psych:             Sign Out: Acceptable/Baseline neuro status   Airway/Respiratory:             Sign Out: Acceptable/Baseline resp. status   CV/Hemodynamics:             Sign Out: Acceptable CV status   Other NRE: NONE   DID A NON-ROUTINE EVENT OCCUR? No           Last vitals:  Vitals Value Taken Time   BP     Temp     Pulse     Resp     SpO2         Electronically Signed By: David Kellerman, APRN CRNA  July 18, 2022  11:48 PM

## 2022-07-19 NOTE — PROGRESS NOTES
Verbally responding only with yes or no to writer. Unable to assess orientation. Denies pain, no nonverbal s/sx of pain noted. Dressing changed with surgeon. Incontinent of urine, external catheter applied. Will continue to monitor.

## 2022-07-19 NOTE — OR NURSING
PACU Transfer Note    Jimmy Marcus was transferred to [facility] via cart.  Equipment used for transport:  none.  Accompanied by:  Veronique RN  Report given to Johanna SNELL  Prescriptions were: none    PACU Respiratory Event Documentation     1) Episodes of Apnea greater than or equal to 10 seconds: 0    2) Bradypnea - less than 8 breaths per minute: 0    3) Pain score on 0 to 10 scale: 0    4) Pain-sedation mismatch (yes or no): no    5) Repeated 02 desaturation less than 90% (yes or no): no    Anesthesia notified? (yes or no): na    Any of the above events occuring repeatedly in separate 30 minute intervals may be considered recurrent PACU respiratory events.    Patient stable and meets phase 1 discharge criteria for transport from PACU.

## 2022-07-19 NOTE — ASSESSMENT & PLAN NOTE
Possible taking lisinopril at home  Pharmacy that this info was false  BP readings high somewhat, will follow, consider starting low dose lisinopril if continues

## 2022-07-19 NOTE — ASSESSMENT & PLAN NOTE
Currently living in an assisted living, memory care unit  Unable to give any additional history poor insight  Restart home meds including Zyprexa and Prozac

## 2022-07-19 NOTE — PROGRESS NOTES
GENERAL SURGERY PROGRESS NOTE  7/19/2022      Interval history:   No acute events overnight. Unable to obtain history of patient. Nurse states he has seemed comfortable.     Physical Exam:   Vital signs are reviewed and there are no significant abnormalities.     UOP over past 24 hours is recorded as x2 - incontinent of urine    General: laying in bed, appears comfortable.   Skin: wound on left buttock with same dimensions and appearance as last night. Base is clean. No evidence of infection spreading.     Labs are reviewed and are significant for WBC 12.8, hgb 10.6    No new imaging       Assessment / Plan:   Jimmy Marcus is a 76 year old male who is POD#1 from debridement of left buttock necrotizing soft tissue infection secondary to decubitus ulcer.     Continue Iv antibiotics   Daily dressing changes, wet-to-dry     - likely place wound VAC prior to discharge   Regular diet OK  Appreciate cares per medicine       ANTONIA Palma MD   7/19/2022

## 2022-07-20 ENCOUNTER — ANESTHESIA EVENT (OUTPATIENT)
Dept: MEDSURG UNIT | Facility: OTHER | Age: 77
DRG: 264 | End: 2022-07-20
Payer: MEDICARE

## 2022-07-20 ENCOUNTER — ANESTHESIA (OUTPATIENT)
Dept: MEDSURG UNIT | Facility: OTHER | Age: 77
DRG: 264 | End: 2022-07-20
Payer: MEDICARE

## 2022-07-20 LAB
ANION GAP SERPL CALCULATED.3IONS-SCNC: 6 MMOL/L (ref 3–14)
BACTERIA SKIN AEROBE CULT: ABNORMAL
BACTERIA SKIN AEROBE CULT: ABNORMAL
BUN SERPL-MCNC: 17 MG/DL (ref 7–25)
CALCIUM SERPL-MCNC: 8.5 MG/DL (ref 8.6–10.3)
CHLORIDE BLD-SCNC: 102 MMOL/L (ref 98–107)
CO2 SERPL-SCNC: 27 MMOL/L (ref 21–31)
CREAT SERPL-MCNC: 1.07 MG/DL (ref 0.7–1.3)
ERYTHROCYTE [DISTWIDTH] IN BLOOD BY AUTOMATED COUNT: 13.7 % (ref 10–15)
GFR SERPL CREATININE-BSD FRML MDRD: 71 ML/MIN/1.73M2
GLUCOSE BLD-MCNC: 96 MG/DL (ref 70–105)
GLUCOSE BLDC GLUCOMTR-MCNC: 101 MG/DL (ref 70–99)
GRAM STAIN RESULT: ABNORMAL
HCT VFR BLD AUTO: 34 % (ref 40–53)
HGB BLD-MCNC: 11 G/DL (ref 13.3–17.7)
MCH RBC QN AUTO: 28.2 PG (ref 26.5–33)
MCHC RBC AUTO-ENTMCNC: 32.4 G/DL (ref 31.5–36.5)
MCV RBC AUTO: 87 FL (ref 78–100)
PLATELET # BLD AUTO: 298 10E3/UL (ref 150–450)
POTASSIUM BLD-SCNC: 4.1 MMOL/L (ref 3.5–5.1)
RBC # BLD AUTO: 3.9 10E6/UL (ref 4.4–5.9)
SODIUM SERPL-SCNC: 135 MMOL/L (ref 134–144)
WBC # BLD AUTO: 8.3 10E3/UL (ref 4–11)

## 2022-07-20 PROCEDURE — 80048 BASIC METABOLIC PNL TOTAL CA: CPT | Performed by: FAMILY MEDICINE

## 2022-07-20 PROCEDURE — 250N000011 HC RX IP 250 OP 636: Performed by: SURGERY

## 2022-07-20 PROCEDURE — 120N000001 HC R&B MED SURG/OB

## 2022-07-20 PROCEDURE — 99232 SBSQ HOSP IP/OBS MODERATE 35: CPT | Performed by: FAMILY MEDICINE

## 2022-07-20 PROCEDURE — 258N000003 HC RX IP 258 OP 636: Performed by: FAMILY MEDICINE

## 2022-07-20 PROCEDURE — 250N000011 HC RX IP 250 OP 636: Performed by: FAMILY MEDICINE

## 2022-07-20 PROCEDURE — 250N000013 HC RX MED GY IP 250 OP 250 PS 637: Performed by: SURGERY

## 2022-07-20 PROCEDURE — 36415 COLL VENOUS BLD VENIPUNCTURE: CPT | Performed by: FAMILY MEDICINE

## 2022-07-20 PROCEDURE — 85014 HEMATOCRIT: CPT | Performed by: FAMILY MEDICINE

## 2022-07-20 PROCEDURE — 99231 SBSQ HOSP IP/OBS SF/LOW 25: CPT | Mod: 25 | Performed by: SURGERY

## 2022-07-20 PROCEDURE — 250N000009 HC RX 250: Performed by: NURSE ANESTHETIST, CERTIFIED REGISTERED

## 2022-07-20 PROCEDURE — 36410 VNPNXR 3YR/> PHY/QHP DX/THER: CPT | Performed by: NURSE ANESTHETIST, CERTIFIED REGISTERED

## 2022-07-20 PROCEDURE — 258N000003 HC RX IP 258 OP 636: Performed by: SURGERY

## 2022-07-20 RX ORDER — LIDOCAINE HYDROCHLORIDE 10 MG/ML
INJECTION, SOLUTION INFILTRATION; PERINEURAL PRN
Status: DISCONTINUED | OUTPATIENT
Start: 2022-07-20 | End: 2022-07-20

## 2022-07-20 RX ADMIN — SODIUM CHLORIDE: 9 INJECTION, SOLUTION INTRAVENOUS at 00:43

## 2022-07-20 RX ADMIN — ACETAMINOPHEN 975 MG: 325 TABLET, FILM COATED ORAL at 15:29

## 2022-07-20 RX ADMIN — LIDOCAINE HYDROCHLORIDE 0.2 ML: 10 INJECTION, SOLUTION INFILTRATION; PERINEURAL at 23:08

## 2022-07-20 RX ADMIN — LATANOPROST 1 DROP: 50 SOLUTION/ DROPS OPHTHALMIC at 22:38

## 2022-07-20 RX ADMIN — LIDOCAINE HYDROCHLORIDE 0.2 ML: 10 INJECTION, SOLUTION INFILTRATION; PERINEURAL at 23:11

## 2022-07-20 RX ADMIN — LORATADINE 10 MG: 10 TABLET ORAL at 10:42

## 2022-07-20 RX ADMIN — VANCOMYCIN HYDROCHLORIDE 1500 MG: 1 INJECTION, POWDER, LYOPHILIZED, FOR SOLUTION INTRAVENOUS at 04:17

## 2022-07-20 RX ADMIN — HYDROMORPHONE HYDROCHLORIDE 0.2 MG: 0.2 INJECTION, SOLUTION INTRAMUSCULAR; INTRAVENOUS; SUBCUTANEOUS at 15:50

## 2022-07-20 RX ADMIN — CLINDAMYCIN PHOSPHATE 600 MG: 600 INJECTION, SOLUTION INTRAVENOUS at 02:41

## 2022-07-20 RX ADMIN — TAZOBACTAM SODIUM AND PIPERACILLIN SODIUM 4.5 G: 500; 4 INJECTION, SOLUTION INTRAVENOUS at 21:11

## 2022-07-20 RX ADMIN — SENNOSIDES AND DOCUSATE SODIUM 1 TABLET: 50; 8.6 TABLET ORAL at 21:43

## 2022-07-20 RX ADMIN — TAZOBACTAM SODIUM AND PIPERACILLIN SODIUM 4.5 G: 500; 4 INJECTION, SOLUTION INTRAVENOUS at 02:07

## 2022-07-20 RX ADMIN — TAZOBACTAM SODIUM AND PIPERACILLIN SODIUM 4.5 G: 500; 4 INJECTION, SOLUTION INTRAVENOUS at 15:30

## 2022-07-20 RX ADMIN — SODIUM CHLORIDE: 9 INJECTION, SOLUTION INTRAVENOUS at 17:34

## 2022-07-20 RX ADMIN — CLINDAMYCIN PHOSPHATE 600 MG: 600 INJECTION, SOLUTION INTRAVENOUS at 10:58

## 2022-07-20 RX ADMIN — FLUOXETINE 30 MG: 20 CAPSULE ORAL at 10:42

## 2022-07-20 RX ADMIN — HYDROMORPHONE HYDROCHLORIDE 0.2 MG: 0.2 INJECTION, SOLUTION INTRAMUSCULAR; INTRAVENOUS; SUBCUTANEOUS at 23:39

## 2022-07-20 RX ADMIN — SENNOSIDES AND DOCUSATE SODIUM 1 TABLET: 50; 8.6 TABLET ORAL at 10:42

## 2022-07-20 RX ADMIN — OLANZAPINE 10 MG: 2.5 TABLET ORAL at 21:42

## 2022-07-20 RX ADMIN — CLINDAMYCIN PHOSPHATE 600 MG: 600 INJECTION, SOLUTION INTRAVENOUS at 18:12

## 2022-07-20 RX ADMIN — ENOXAPARIN SODIUM 40 MG: 40 INJECTION SUBCUTANEOUS at 21:43

## 2022-07-20 RX ADMIN — TAZOBACTAM SODIUM AND PIPERACILLIN SODIUM 4.5 G: 500; 4 INJECTION, SOLUTION INTRAVENOUS at 09:24

## 2022-07-20 RX ADMIN — POLYETHYLENE GLYCOL 3350 17 G: 17 POWDER, FOR SOLUTION ORAL at 10:42

## 2022-07-20 RX ADMIN — ACETAMINOPHEN 975 MG: 325 TABLET, FILM COATED ORAL at 21:39

## 2022-07-20 ASSESSMENT — ACTIVITIES OF DAILY LIVING (ADL)
ADLS_ACUITY_SCORE: 53
ADLS_ACUITY_SCORE: 49
ADLS_ACUITY_SCORE: 51
ADLS_ACUITY_SCORE: 49

## 2022-07-20 NOTE — PROGRESS NOTES
SAFETY CHECKLIST  ID Bands and Risk clasps correct and in place (DNR, Fall risk, Allergy, Latex, Limb):  Yes  All Lines Reconciled and labeled correctly: Yes  Whiteboard updated:Yes  Environmental interventions (bed/chair alarm on, call light, side rails, restraints, sitter....): Yes  Verify Tele #: NA  Malini Campos RN on 7/20/2022 at 7:37 AM

## 2022-07-20 NOTE — PROGRESS NOTES
Patient pulling at IV line, removed dressing, fingers were in his wound. Used saline to clean out wound, wet to dry with 4x4, dry gauze and abd.   Malini Campos RN on 7/20/2022 at 4:21 PM

## 2022-07-20 NOTE — PROGRESS NOTES
"Clinical Nutrition / Initial Assessment     Reason for Assessment:  pressure wound    Assessment:   Client History:  Pt with wound infection, debridement by surgery on 7/18. From an assisted living facility, pt has dementia.   Diet Order:  Carb controlled.   Oral Intake:  Staff assist, ate 50-75% so far  Supplement Intake:  Will add Gelatein protein jello for an additional 20 g protein for wound healing  Weight:   Wt Readings from Last 10 Encounters:   07/20/22 87.7 kg (193 lb 6.4 oz)   07/09/19 82.4 kg (181 lb 11.2 oz)   05/28/19 90.7 kg (200 lb)   Height: 6' 0\"  BMI: Body mass index is 26.23 kg/m .    Estimated nutritional needs based on:  current body weight  88 kg / 193 lbs   Estimated energy needs:  9852-5301 kcal/day (20-25 kcal/kg)  Estimated protein needs:  106-132 gm/day (1.2-1.5 g/kg)  Estimated fluid needs:  2627-4222 ml/day (1 ml/kcal)    Malnutrition Criteria:  (Need to have 2 indicators to qualify recommendation)  Energy Intake:  Adequate   Interpretation of Weight Loss:  No significant weight loss  Physical Findings:  No significant findings  Reduced  Strength:  Not Measured    Recommended Nutrition Diagnosis:   Not enough Malnutrition indicators found for medical diagnosis      Nutrition Education: Nutrition education will be provided as appropriate.    Nutrition Diagnosis: Protein:  Increased protein needs related to increased demand for protein eg wound healing as evidenced by stage II wound on buttocks.    Intervention:  Nutrition Prescription:     Nutrition Intervention(s):Recommended general, healthful diet  1. Meals and Snacks: carb controlled diet  2. Medical Food Supplement: gelatein protein jello daily     Nutrition Goal(s):  1. Pt will consume 75% or more at meals and supplements   2. Pt will not have unplanned wt loss during hospitalization   3. Pt will allow staff to assist at meals for greatest intake potential     Monitoring and Evaluation:   Food Intake, diet tolerance, weights, " wound     Discharge Recommendation:   Nutrition Discharge Planning  recommend protein supplement on discharge for wound healing. Recommend MVI with minerals daily as well.     RD will reassess in within 1-5 days or sooner.  Madeleine Rivas RD on 7/20/2022 at 8:51 AM

## 2022-07-20 NOTE — PROGRESS NOTES
Patient was found with IV pulled out and Buttocks dressing removed after self removal. New dressing was applied and and CRNA contacted about new IV placement. Patient has not attempted to pull lines or dressing for the rest of the night. Patient refused morning tylenol. Marshall Waters RN on 7/20/2022 at 6:48 AM

## 2022-07-20 NOTE — PROGRESS NOTES
:     Patient is from [facility]. It was mentioned that patient will have a wound vac put on prior to discharge. Spoke with Nafisa at [facility] and she stated that they do not have a nurse every day to care for the wound vac. Patient will need SNF placement.     Nafisa stated that patient has a  and a guardian through [facility]. Inquired with the facility to see if they had paperwork stating who the patients legal guardian is for his chart.      Call placed to patients guardian Jessica Nathaniel at [facility] ([phone]). No answer. Left Voicemail.      Call placed to patients , Johanna Ontiveros ([phone]). No answer. Left voicemail.     Received patients POLST paperwork has been received from patients AL. Hard copy has been placed in patients shadow chart to be scanned in.     BROOKLYN Steinberg on 7/20/2022 at 2:36 PM

## 2022-07-20 NOTE — PROGRESS NOTES
[facility]    Medicine Progress Note - Hospitalist Service    Date of Admission:  7/18/2022    Assessment & Plan            Decubitus ulcer of left buttock, stage 2 (H)  Presenting from assisted living with ulcer overlying the left buttock area, increasing in size with increased redness  Evaluation in the ED concerning for an invasive infection and he was brought to the OR for incision and drainage emergently by general surgery  Wound described as necrotizing with open area now measuring 11 cm x 7 cm.  Culture fluid positive for gram-positive cocci and gram-negative rods and currently on Zosyn and vancomycin with cultures pending  Today with improved white count, no fever.  Pharmacy has reviewed and is recommending addition of clindamycin for treatment of the necrotizing infection  Continue broad-spectrum antibiotics, add clindamycin, continue IV fluids  General surgery will continue to manage primarily and we will follow.  7/20/2022-clinically stable with inflammatory markers and vitals all normalizing. Plan for now continue current abx pending cultures. Gen surg planning for wound vac at discharge    Necrotizing soft tissue infection  Noted on surgery with debridement of wound  As above, broad-spectrum antibiotics currently on Zosyn, Vanco and clindamycin with cultures pending    Essential hypertension  Taking lisinopril at home  We will plan to restart, follow pressures    Late onset Alzheimer's dementia with behavioral disturbance (H)  Currently living in an assisted living, memory care unit  Unable to give any additional history poor insight  Restart home meds including Zyprexa and Prozac    Type 2 diabetes mellitus without complication, without long-term current use of insulin (H)  Noted previously in chart, currently not on any diabetic meds  Will plan to track sugars, if elevated, may need sliding scale coverage           Diet: Consistent Carbohydrate Diet Moderate Consistent Carb (60 g  CHO per Meal) Diet  Snacks/Supplements Adult: Gelatein Plus; With Meals    DVT Prophylaxis: Enoxaparin (Lovenox) SQ  Roche Catheter: Not present  Central Lines: None  Cardiac Monitoring: None  Code Status: Full Code      Disposition Plan   Likely back to assisted living if able to manage wound in 2-4 days     The patient's care was discussed with the Bedside Nurse and Care Coordinator/.    Matthew Jasso MD  Hospitalist Service  [facility]  Securely message with the Vocera Web Console (learn more here)  Text page via [employer] Paging/Directory         Clinically Significant Risk Factors Present on Admission               # Overweight: Estimated body mass index is 26.23 kg/m  as calculated from the following:    Height as of this encounter: 1.829 m (6').    Weight as of this encounter: 87.7 kg (193 lb 6.4 oz).        ______________________________________________________________________    Interval History   Resting, not communicating with me or staff. Denies pain. Pulled out IV and removed dressing overnite. No other behaviours noted    Data reviewed today: I reviewed all medications, new labs and imaging results over the last 24 hours. I personally reviewed no images or EKG's today.    Physical Exam   Vital Signs: Temp: 98.8  F (37.1  C) Temp src: Tympanic BP: (!) 154/85 Pulse: 68   Resp: 16 SpO2: 98 % O2 Device: None (Room air)    Weight: 193 lbs 6.4 oz  Constitutional: awake, alert, cooperative, no apparent distress, and appears stated age  Respiratory: No increased work of breathing, good air exchange, clear to auscultation bilaterally, no crackles or wheezing  Cardiovascular: regular rate and rhythm  GI: normal bowel sounds, soft and non-distended  Skin: Intact dressing, not removed    Data   Recent Labs   Lab 07/20/22  0651 07/20/22  0621 07/19/22  0729 07/19/22  0638 07/19/22  0550 07/18/22  1455   WBC 8.3  --   --   --  12.8* 20.6*   HGB 11.0*  --   --   --  10.6* 11.8*    MCV 87  --   --   --  88 87     --   --   --  315 341     --   --   --  135 133*   POTASSIUM 4.1  --   --   --  4.1 4.1   CHLORIDE 102  --   --   --  102 99   CO2 27  --   --   --  27 26   BUN 17  --   --   --  21 23   CR 1.07  --   --   --  0.91 0.93   ANIONGAP 6  --   --   --  6 8   GISELA 8.5*  --   --   --  8.7 9.1   GLC 96 101* 110*   < > 111* 201*    < > = values in this interval not displayed.

## 2022-07-20 NOTE — PLAN OF CARE
Goal Outcome Evaluation:   Patient sleepy this morning. More alert this afternoon.  VSS. Patient oriented to self only. CCRQ2. [place] in place. Patient pulling on IV lines and dressing. Mitts were placed for safety reason, will continue to monitor.      Plan of Care Reviewed With: other (see comments)     Overall Patient Progress: no change

## 2022-07-20 NOTE — PROGRESS NOTES
GENERAL SURGERY PROGRESS NOTE  7/20/2022      Interval history:   Overnight pulled out IV and dressing. States he does have pain in his left buttock.     Physical Exam:   Vital signs are reviewed and there are no significant abnormalities.     UOP over past 24 hours is 600 mL    General: laying in bed, appears comfortable.   Skin: wound on left buttock with clean base, no granualtion tissue yet. 11 x 7 cm. No evidence of infection spreading.     Labs are reviewed and are significant for WBC 8.3, hgb 11.0  No new imaging       Assessment / Plan:   Jimmy Marcus is a 76 year old male who is POD#2 from debridement of left buttock necrotizing soft tissue infection secondary to decubitus ulcer.     Continue Iv antibiotics   Daily dressing changes, wet-to-dry     - likely place wound VAC prior to discharge   Regular diet OK  Appreciate cares per medicine       ANTONIA Palma MD   7/20/2022

## 2022-07-21 LAB — PLATELET # BLD AUTO: 342 10E3/UL (ref 150–450)

## 2022-07-21 PROCEDURE — 99231 SBSQ HOSP IP/OBS SF/LOW 25: CPT | Mod: 25 | Performed by: SURGERY

## 2022-07-21 PROCEDURE — 250N000013 HC RX MED GY IP 250 OP 250 PS 637: Performed by: SURGERY

## 2022-07-21 PROCEDURE — 99232 SBSQ HOSP IP/OBS MODERATE 35: CPT | Performed by: FAMILY MEDICINE

## 2022-07-21 PROCEDURE — 120N000001 HC R&B MED SURG/OB

## 2022-07-21 PROCEDURE — 250N000013 HC RX MED GY IP 250 OP 250 PS 637: Performed by: FAMILY MEDICINE

## 2022-07-21 PROCEDURE — 250N000011 HC RX IP 250 OP 636: Performed by: SURGERY

## 2022-07-21 PROCEDURE — 250N000011 HC RX IP 250 OP 636: Performed by: FAMILY MEDICINE

## 2022-07-21 PROCEDURE — 36415 COLL VENOUS BLD VENIPUNCTURE: CPT | Performed by: SURGERY

## 2022-07-21 PROCEDURE — 85049 AUTOMATED PLATELET COUNT: CPT | Performed by: SURGERY

## 2022-07-21 RX ORDER — CEFUROXIME AXETIL 250 MG/1
500 TABLET ORAL EVERY 12 HOURS SCHEDULED
Status: DISCONTINUED | OUTPATIENT
Start: 2022-07-21 | End: 2022-01-01

## 2022-07-21 RX ADMIN — ACETAMINOPHEN 325 MG: 325 TABLET, FILM COATED ORAL at 06:19

## 2022-07-21 RX ADMIN — TAZOBACTAM SODIUM AND PIPERACILLIN SODIUM 4.5 G: 500; 4 INJECTION, SOLUTION INTRAVENOUS at 06:01

## 2022-07-21 RX ADMIN — TAZOBACTAM SODIUM AND PIPERACILLIN SODIUM 4.5 G: 500; 4 INJECTION, SOLUTION INTRAVENOUS at 13:31

## 2022-07-21 RX ADMIN — HYDROXYZINE HYDROCHLORIDE 10 MG: 10 TABLET ORAL at 00:15

## 2022-07-21 RX ADMIN — LATANOPROST 1 DROP: 50 SOLUTION/ DROPS OPHTHALMIC at 21:55

## 2022-07-21 RX ADMIN — HYDROMORPHONE HYDROCHLORIDE 0.2 MG: 0.2 INJECTION, SOLUTION INTRAMUSCULAR; INTRAVENOUS; SUBCUTANEOUS at 11:24

## 2022-07-21 RX ADMIN — OLANZAPINE 10 MG: 2.5 TABLET ORAL at 21:55

## 2022-07-21 RX ADMIN — ACETAMINOPHEN 975 MG: 325 TABLET, FILM COATED ORAL at 14:43

## 2022-07-21 RX ADMIN — ENOXAPARIN SODIUM 40 MG: 40 INJECTION SUBCUTANEOUS at 21:55

## 2022-07-21 RX ADMIN — FLUOXETINE 30 MG: 20 CAPSULE ORAL at 10:56

## 2022-07-21 RX ADMIN — LORATADINE 10 MG: 10 TABLET ORAL at 10:56

## 2022-07-21 RX ADMIN — HYDROMORPHONE HYDROCHLORIDE 0.2 MG: 0.2 INJECTION, SOLUTION INTRAMUSCULAR; INTRAVENOUS; SUBCUTANEOUS at 07:44

## 2022-07-21 RX ADMIN — CLINDAMYCIN PHOSPHATE 600 MG: 600 INJECTION, SOLUTION INTRAVENOUS at 11:03

## 2022-07-21 RX ADMIN — POLYETHYLENE GLYCOL 3350 17 G: 17 POWDER, FOR SOLUTION ORAL at 10:56

## 2022-07-21 RX ADMIN — SENNOSIDES AND DOCUSATE SODIUM 1 TABLET: 50; 8.6 TABLET ORAL at 21:55

## 2022-07-21 RX ADMIN — CLINDAMYCIN PHOSPHATE 600 MG: 600 INJECTION, SOLUTION INTRAVENOUS at 01:31

## 2022-07-21 RX ADMIN — CEFUROXIME AXETIL 500 MG: 250 TABLET, FILM COATED ORAL at 20:42

## 2022-07-21 ASSESSMENT — ACTIVITIES OF DAILY LIVING (ADL)
ADLS_ACUITY_SCORE: 49
ADLS_ACUITY_SCORE: 52
ADLS_ACUITY_SCORE: 48
ADLS_ACUITY_SCORE: 54
ADLS_ACUITY_SCORE: 48
ADLS_ACUITY_SCORE: 52
ADLS_ACUITY_SCORE: 48
ADLS_ACUITY_SCORE: 54
ADLS_ACUITY_SCORE: 49
ADLS_ACUITY_SCORE: 49

## 2022-07-21 NOTE — PROGRESS NOTES
GENERAL SURGERY PROGRESS NOTE  7/21/2022      Interval history:   Overnight pulled out IV and dressing.  Complains of pain in the left buttock.     Physical Exam:   Vital signs are reviewed and there are no significant abnormalities.     UOP over past 24 hours is 1400 mL    General: laying in bed, appears comfortable.   Skin: wound on left buttock with clean base - no exudate, no granualtion tissue yet. 11 x 7 x 1.5 cm, there is no tunneling or undermining. No evidence of infection spreading.     Wound cultures are growing E. coli and staph aureus    No new imaging       Assessment / Plan:   Jimmy Marcus is a 76 year old male who is POD#3 from debridement of left buttock necrotizing soft tissue infection secondary to decubitus ulcer.     Continue Iv antibiotics     -We will transition to oral antibiotics tomorrow  Daily dressing changes, wet-to-dry     - likely place wound VAC prior to discharge   Regular diet OK  Appreciate cares per medicine   Likely discharge Monday 7/25       ANTONIA Palma MD   7/20/2022

## 2022-07-21 NOTE — PROGRESS NOTES
:     Spoke with [facility] Guardian, Liliya Rueda ([phone]) and she stated that referrals can be sent to [facility] and [facility].     Spoke with patients , Johanna Ontiveros, to discuss patients discharge planning needs. She will fax the most current guardianship paperwork they have for patient.     Pt/family was given the Medicare Compare list for SNF, with associated star ratings to assist with choice for referrals/discharge planning Yes    Education was given to pt/family that star ratings are updated/maintained by Medicare and can be reviewed by visiting www.medicare.gov Yes    A referral has been sent to [facility] and [facility] SNF.     BROOKLYN Steinberg on 7/21/2022 at 9:43 AM

## 2022-07-21 NOTE — PROGRESS NOTES
SAFETY CHECKLIST  ID Bands and Risk clasps correct and in place (DNR, Fall risk, Allergy, Latex, Limb):  Yes  All Lines Reconciled and labeled correctly: Yes  Whiteboard updated:Yes  Environmental interventions (bed/chair alarm on, call light, side rails, restraints, sitter....): Yes  Verify Tele #: LUIS F Campos RN on 7/21/2022 at 7:06 AM

## 2022-07-21 NOTE — PROGRESS NOTES
[facility]    Medicine Progress Note - Hospitalist Service    Date of Admission:  7/18/2022    Assessment & Plan            Decubitus ulcer of left buttock, stage 2 (H)  Presenting from assisted living with ulcer overlying the left buttock area, increasing in size with increased redness  Evaluation in the ED concerning for an invasive infection and he was brought to the OR for incision and drainage emergently by general surgery  Wound described as necrotizing with open area now measuring 11 cm x 7 cm.  Culture fluid positive for gram-positive cocci and gram-negative rods and currently on Zosyn and vancomycin with cultures pending  Today with improved white count, no fever.  Pharmacy has reviewed and is recommending addition of clindamycin for treatment of the necrotizing infection  Continue broad-spectrum antibiotics, add clindamycin, continue IV fluids  General surgery will continue to manage primarily and we will follow.  7/20/2022-clinically stable with inflammatory markers and vitals all normalizing. Plan for now continue current abx pending cultures. Gen surg planning for wound vac at discharge  7/21/2022-Wound clean per nursing. Gen surg waiting for granulation tissue and planning for wound vac placement early next week, ?Monday. AL not able to manage wound vac, so will SNF for a period to manage    Necrotizing soft tissue infection  Noted on surgery with debridement of wound  As above, broad-spectrum antibiotics currently on Zosyn, Vanco and clindamycin with cultures pending  Cultures pos for e. Coli and staph, . Will stop vanco. Per pharmacy when better could move to oral cefalosporin, question ceftin    Essential hypertension  Possible taking lisinopril at home  Pharmacy that this info was false  BP readings high somewhat, will follow, consider starting low dose lisinopril if continues    Late onset Alzheimer's dementia with behavioral disturbance (H)  Currently living in an assisted  living, memory care unit  Unable to give any additional history poor insight  Restart home meds including Zyprexa and Prozac    Type 2 diabetes mellitus without complication, without long-term current use of insulin (H)  Noted previously in chart, currently not on any diabetic meds  Will plan to track sugars, if elevated, may need sliding scale coverage  7/21/2022-sugars have remained low, will follow           Diet: Consistent Carbohydrate Diet Moderate Consistent Carb (60 g CHO per Meal) Diet  Snacks/Supplements Adult: Gelatein Plus; With Meals    DVT Prophylaxis: Enoxaparin (Lovenox) SQ  Roche Catheter: Not present  Central Lines: None  Cardiac Monitoring: None  Code Status: No CPR- Do NOT Intubate      Disposition Plan    3-4 days, will need SNF for wound care     The patient's care was discussed with the Bedside Nurse, Patient and Dr. Rodrigues.    Matthew Jasso MD  Hospitalist Service  [facility]  Securely message with the Vocera Web Console (learn more here)  Text page via [employer] Paging/Directory         Clinically Significant Risk Factors Present on Admission               # Overweight: Estimated body mass index is 25.74 kg/m  as calculated from the following:    Height as of this encounter: 1.829 m (6').    Weight as of this encounter: 86.1 kg (189 lb 12.8 oz).        ______________________________________________________________________    Interval History   More awake, complaining of pain over hip area. Nursing changed dressing, no bleeding, doing wet to dry, surgery planning placing wound vac next week. Patient has been confused, pulling IV    Data reviewed today: I reviewed all medications, new labs and imaging results over the last 24 hours. I personally reviewed no images or EKG's today.    Physical Exam   Vital Signs: Temp: 98  F (36.7  C) Temp src: Tympanic BP: (!) 150/83 Pulse: 76   Resp: 16 SpO2: 98 % O2 Device: None (Room air)    Weight: 189 lbs 12.8  oz  Constitutional: awake, alert, cooperative, no apparent distress, and appears stated age  Respiratory: No increased work of breathing, good air exchange, clear to auscultation bilaterally, no crackles or wheezing  Cardiovascular: regular rate and rhythm  GI: normal bowel sounds, soft and non-distended  Skin: wound dressed over left hip area    Data   Recent Labs   Lab 07/21/22  0638 07/20/22  0651 07/20/22  0621 07/19/22  0729 07/19/22  0638 07/19/22  0550 07/18/22  1455   WBC  --  8.3  --   --   --  12.8* 20.6*   HGB  --  11.0*  --   --   --  10.6* 11.8*   MCV  --  87  --   --   --  88 87    298  --   --   --  315 341   NA  --  135  --   --   --  135 133*   POTASSIUM  --  4.1  --   --   --  4.1 4.1   CHLORIDE  --  102  --   --   --  102 99   CO2  --  27  --   --   --  27 26   BUN  --  17  --   --   --  21 23   CR  --  1.07  --   --   --  0.91 0.93   ANIONGAP  --  6  --   --   --  6 8   GISELA  --  8.5*  --   --   --  8.7 9.1   GLC  --  96 101* 110*   < > 111* 201*    < > = values in this interval not displayed.

## 2022-07-21 NOTE — PLAN OF CARE
Goal Outcome Evaluation:  Patient alert and confused. VSS. Pain controlled with scheduled and PRN medications, see MAR.  Patient CCRQ2. Manwick in place. Dressing changed, wet to dry 4x4's, sterile saline, abd, tape.  Patient resting comfortably. Blood pressure (!) 150/83, pulse 76, temperature 98  F (36.7  C), temperature source Tympanic, resp. rate 16, height 1.829 m (6'), weight 86.1 kg (189 lb 12.8 oz), SpO2 98 %.     Plan of Care Reviewed With: other (see comments)     Overall Patient Progress: no change

## 2022-07-21 NOTE — PLAN OF CARE
Patient admitted with decubitus ulcer of left buttock, stage 2. Patient is alert & confused to time situation, & place; Alert to his own name; struggles with his birth date; Patient rambles & is difficult to understand; PAINAD scale used to determine pain; Zosyn antibiotic rescheduled because patient pulled out his IV; Anesthesia placed new IV.      Goal Outcome Evaluation:  Problem: Plan of Care - These are the overarching goals to be used throughout the patient stay.    Goal: Plan of Care Review/Shift Note  Description: The Plan of Care Review/Shift note should be completed every shift.  The Outcome Evaluation is a brief statement about your assessment that the patient is improving, declining, or no change.  This information will be displayed automatically on your shift note.  Outcome: Ongoing, Progressing     Problem: Plan of Care - These are the overarching goals to be used throughout the patient stay.    Goal: Optimal Comfort and Wellbeing  Outcome: Ongoing, Progressing  Intervention: Monitor Pain and Promote Comfort  Recent Flowsheet Documentation  Taken 7/20/2022 2354 by Jessica Wilhelm RN  Pain Management Interventions: cold applied  Taken 7/20/2022 2243 by Jessica Wilhelm RN  Pain Management Interventions: cold applied  Taken 7/20/2022 2224 by Jessica Wilhelm RN  Pain Management Interventions: cold applied  Taken 7/20/2022 2114 by Jessica Wilhelm RN  Pain Management Interventions: cold applied     Problem: Pain Acute  Goal: Acceptable Pain Control and Functional Ability  Outcome: Ongoing, Progressing  Intervention: Develop Pain Management Plan  Recent Flowsheet Documentation  Taken 7/20/2022 2354 by Jessica Wilhelm RN  Pain Management Interventions: cold applied  Taken 7/20/2022 2243 by Jessica Wilhelm RN  Pain Management Interventions: cold applied  Taken 7/20/2022 2224 by Jessica Wilhelm RN  Pain Management Interventions: cold applied  Taken 7/20/2022 2114 by Jessica Wilhelm RN  Pain Management  Interventions: cold applied  Intervention: Prevent or Manage Pain  Recent Flowsheet Documentation  Taken 7/21/2022 0600 by Jessica Wilhelm, RN  Medication Review/Management: medications reviewed  Taken 7/20/2022 2114 by Jessica Wilhelm, RN  Medication Review/Management: medications reviewed     Problem: Confusion Chronic  Goal: Optimal Cognitive Function  Outcome: Ongoing, Progressing     Problem: Suicide Risk  Goal: Absence of Self-Harm  Outcome: Ongoing, Progressing

## 2022-07-22 PROCEDURE — 250N000011 HC RX IP 250 OP 636: Performed by: SURGERY

## 2022-07-22 PROCEDURE — 250N000013 HC RX MED GY IP 250 OP 250 PS 637: Performed by: SURGERY

## 2022-07-22 PROCEDURE — 250N000013 HC RX MED GY IP 250 OP 250 PS 637: Performed by: INTERNAL MEDICINE

## 2022-07-22 PROCEDURE — 99231 SBSQ HOSP IP/OBS SF/LOW 25: CPT | Mod: 25 | Performed by: SURGERY

## 2022-07-22 PROCEDURE — 99232 SBSQ HOSP IP/OBS MODERATE 35: CPT | Performed by: INTERNAL MEDICINE

## 2022-07-22 PROCEDURE — 120N000001 HC R&B MED SURG/OB

## 2022-07-22 PROCEDURE — 250N000013 HC RX MED GY IP 250 OP 250 PS 637: Performed by: FAMILY MEDICINE

## 2022-07-22 RX ORDER — SACCHAROMYCES BOULARDII 250 MG
250 CAPSULE ORAL 2 TIMES DAILY
Status: DISCONTINUED | OUTPATIENT
Start: 2022-07-22 | End: 2022-01-01 | Stop reason: HOSPADM

## 2022-07-22 RX ADMIN — HYDROXYZINE HYDROCHLORIDE 10 MG: 10 TABLET ORAL at 17:25

## 2022-07-22 RX ADMIN — CEFUROXIME AXETIL 500 MG: 250 TABLET, FILM COATED ORAL at 07:56

## 2022-07-22 RX ADMIN — LATANOPROST 1 DROP: 50 SOLUTION/ DROPS OPHTHALMIC at 22:06

## 2022-07-22 RX ADMIN — CEFUROXIME AXETIL 500 MG: 250 TABLET, FILM COATED ORAL at 21:46

## 2022-07-22 RX ADMIN — Medication 250 MG: at 21:46

## 2022-07-22 RX ADMIN — LORATADINE 10 MG: 10 TABLET ORAL at 09:44

## 2022-07-22 RX ADMIN — ENOXAPARIN SODIUM 40 MG: 40 INJECTION SUBCUTANEOUS at 21:47

## 2022-07-22 RX ADMIN — Medication 250 MG: at 13:44

## 2022-07-22 RX ADMIN — OXYCODONE HYDROCHLORIDE 10 MG: 5 TABLET ORAL at 15:19

## 2022-07-22 RX ADMIN — OLANZAPINE 10 MG: 2.5 TABLET ORAL at 21:47

## 2022-07-22 RX ADMIN — FLUOXETINE 30 MG: 20 CAPSULE ORAL at 10:12

## 2022-07-22 ASSESSMENT — ACTIVITIES OF DAILY LIVING (ADL)
ADLS_ACUITY_SCORE: 55
ADLS_ACUITY_SCORE: 55
ADLS_ACUITY_SCORE: 54
ADLS_ACUITY_SCORE: 55
ADLS_ACUITY_SCORE: 55
ADLS_ACUITY_SCORE: 54
ADLS_ACUITY_SCORE: 55
ADLS_ACUITY_SCORE: 55
ADLS_ACUITY_SCORE: 54

## 2022-07-22 NOTE — PLAN OF CARE
"Patient admitted with ulcer of left buttock.  He did complain of pain today and was treated with 10 mg oxycodone.  His dressing is CDI, he has had loose stools today (MD aware), and a low grade fever.  Patient is alert but has a hx of dementia, at times his speech is garbled and unclear.  Will follow.    Problem: Plan of Care - These are the overarching goals to be used throughout the patient stay.    Goal: Patient-Specific Goal (Individualized)  Description: You can add care plan individualizations to a care plan. Examples of Individualization might be:  \"Parent requests to be called daily at 9am for status\", \"I have a hard time hearing out of my right ear\", or \"Do not touch me to wake me up as it startles me\".  Flowsheets (Taken 7/22/2022 1644)  Individualized Care Needs: pain control  Goal: Absence of Hospital-Acquired Illness or Injury  Intervention: Identify and Manage Fall Risk  Recent Flowsheet Documentation  Taken 7/22/2022 1519 by Sabrina Davenport RN  Safety Promotion/Fall Prevention: safety round/check completed  Taken 7/22/2022 0751 by Sabrina Davenport RN  Safety Promotion/Fall Prevention: safety round/check completed  Goal: Optimal Comfort and Wellbeing  Intervention: Monitor Pain and Promote Comfort  Recent Flowsheet Documentation  Taken 7/22/2022 0751 by Sabrina Davenport RN  Pain Management Interventions: declines     Problem: Pain Acute  Goal: Acceptable Pain Control and Functional Ability  Intervention: Develop Pain Management Plan  Recent Flowsheet Documentation  Taken 7/22/2022 0751 by Sabrina Davenport RN  Pain Management Interventions: declines  Intervention: Prevent or Manage Pain  Recent Flowsheet Documentation  Taken 7/22/2022 1519 by Sabrina Davenport RN  Medication Review/Management: medications reviewed  Taken 7/22/2022 0751 by Sabrina Davenport RN  Medication Review/Management: medications reviewed   Goal Outcome Evaluation:                      "

## 2022-07-22 NOTE — PROGRESS NOTES
:     Patient is from [facility] in [place]. Patient will need SNF at the time of discharge due to wound vac being placed. [facility] and [facility] are currently screening patient.      will continue to follow for discharge planning needs.     BROOKLYN Steinberg on 7/22/2022 at 1:31 PM     Addendum:     Spoke with Beverly from [facility]. They would like to see how the patient does of the weekend and will re screen on Monday.     BROOKLYN Steinberg on 7/22/2022 at 3:37 PM

## 2022-07-22 NOTE — PROGRESS NOTES
GENERAL SURGERY PROGRESS NOTE  7/22/2022      Interval history:   No acute events overnight.  Complains of pain in the left buttock.     Physical Exam:   Vital signs are reviewed and there are no significant abnormalities.     UOP over past 24 hours is 1250 mL  Bm x3    General: laying in bed, appears comfortable.   Skin: wound on left buttock with clean base - no exudate, no granualtion tissue yet. 11 x 7 x 1.5 cm, there is no tunneling or undermining. No evidence of infection spreading.     Wound cultures are growing E. coli and staph aureus    No new imaging       Assessment / Plan:   Jimmy Marcus is a 76 year old male who is POD#4 from debridement of left buttock necrotizing soft tissue infection secondary to decubitus ulcer.     Transition to oral antibiotics   Daily dressing changes, wet-to-dry     - likely place wound VAC prior to discharge   Regular diet OK  Appreciate cares per medicine   Likely discharge Monday 7/25       ANTONIA Palma MD   7/22/2022

## 2022-07-22 NOTE — PLAN OF CARE
"Pt is alert to self, not time, place, or situation. Had complaints of pain, but refused PRN oxycodone. Wet to dry dressing change was completed, tissue looks healthy, mild to moderate serosanguinous drainage, LS clear, cardiac WDL. Pt needs help being repositioned.       /78 (BP Location: Left arm, Patient Position: Semi-Rodriguez's, Cuff Size: Adult Regular)   Pulse 71   Temp 99.4  F (37.4  C) (Tympanic)   Resp 16   Ht 1.829 m (6')   Wt 84.1 kg (185 lb 8 oz)   SpO2 99%   BMI 25.16 kg/m        Goal Outcome Evaluation:          Problem: Plan of Care - These are the overarching goals to be used throughout the patient stay.    Goal: Plan of Care Review/Shift Note  Description: The Plan of Care Review/Shift note should be completed every shift.  The Outcome Evaluation is a brief statement about your assessment that the patient is improving, declining, or no change.  This information will be displayed automatically on your shift note.  Outcome: Ongoing, Not Progressing  Flowsheets (Taken 7/22/2022 0650)  Plan of Care Reviewed With: patient  Overall Patient Progress: no change  Goal: Patient-Specific Goal (Individualized)  Description: You can add care plan individualizations to a care plan. Examples of Individualization might be:  \"Parent requests to be called daily at 9am for status\", \"I have a hard time hearing out of my right ear\", or \"Do not touch me to wake me up as it startles me\".  Outcome: Ongoing, Not Progressing  Goal: Absence of Hospital-Acquired Illness or Injury  Outcome: Ongoing, Not Progressing  Intervention: Identify and Manage Fall Risk  Recent Flowsheet Documentation  Taken 7/22/2022 0042 by Cheng Jenkins, RN  Safety Promotion/Fall Prevention: safety round/check completed  Taken 7/21/2022 2200 by Cheng Jenkins, RN  Safety Promotion/Fall Prevention: safety round/check completed  Intervention: Prevent and Manage VTE (Venous Thromboembolism) Risk  Recent Flowsheet Documentation  Taken 7/22/2022 0042 " by Cheng Jenkins RN  VTE Prevention/Management: SCDs (sequential compression devices) on  Goal: Optimal Comfort and Wellbeing  Outcome: Ongoing, Not Progressing  Intervention: Monitor Pain and Promote Comfort  Recent Flowsheet Documentation  Taken 7/22/2022 0105 by Cheng Jenkins RN  Pain Management Interventions: declines  Taken 7/21/2022 2040 by Cheng Jenkins RN  Pain Management Interventions: declines  Goal: Readiness for Transition of Care  Outcome: Ongoing, Not Progressing     Problem: Pain Acute  Goal: Acceptable Pain Control and Functional Ability  Outcome: Ongoing, Not Progressing  Intervention: Develop Pain Management Plan  Recent Flowsheet Documentation  Taken 7/22/2022 0105 by Cheng Jenkins RN  Pain Management Interventions: declines  Taken 7/21/2022 2040 by Cheng Jenkins RN  Pain Management Interventions: declines  Intervention: Prevent or Manage Pain  Recent Flowsheet Documentation  Taken 7/22/2022 0042 by Cheng Jenkins RN  Medication Review/Management: medications reviewed  Taken 7/21/2022 2200 by Cheng Jenkins RN  Medication Review/Management: medications reviewed     Problem: Confusion Chronic  Goal: Optimal Cognitive Function  Outcome: Ongoing, Not Progressing     Problem: Suicide Risk  Goal: Absence of Self-Harm  Outcome: Ongoing, Not Progressing

## 2022-07-22 NOTE — PROGRESS NOTES
SAFETY CHECKLIST  ID Bands and Risk clasps correct and in place (DNR, Fall risk, Allergy, Latex, Limb):  Yes  All Lines Reconciled and labeled correctly: Yes  Whiteboard updated:Yes  Environmental interventions (bed/chair alarm on, call light, side rails, restraints, sitter....): Yes  Verify Tele #: N/A

## 2022-07-22 NOTE — PROGRESS NOTES
[facility]  Hospitalist Progress Note    Assessment & Plan   Jimmy Marcus is a 76 year old male who was admitted on 7/18/2022.     Clinically Significant Risk Factors Present on Admission               # Overweight: Estimated body mass index is 25.16 kg/m  as calculated from the following:    Height as of this encounter: 1.829 m (6').    Weight as of this encounter: 84.1 kg (185 lb 8 oz).         >  Decubitus ulcer of left buttock, stage 2  > Antibiotic associated diarrhea  > Necrotizing soft tissue infection   -- Appreciate General Surgery   -- Continue oral cefuroxime   -- Start florastore      Late onset Alzheimer's dementia with behavioral disturbance   -- Continue olanzapine    >  Essential hypertension  >  Type 2 diabetes mellitus without complication, without long-term current use of insulin    -- diabetic diet    Diet: Consistent Carbohydrate Diet Moderate Consistent Carb (60 g CHO per Meal) Diet  Snacks/Supplements Adult: Gelatein Plus; With Meals    DVT Prophylaxis: Enoxaparin (Lovenox) SQ  Roche Catheter: Not present  Code Status: No CPR- Do NOT Intubate           Disposition Plan   Anticipate Monday to SNF.  Entered: Juan Ramsey MD 07/22/2022, 12:39 PM       The patient's care was discussed with the Bedside Nurse.    Internal medicine signing off.  Please call if the clinical situation changes to request our assistance.    Juan Ramsey MD  Hospitalist Service  [facility]  Contact information available via [facility] Paging/Directory    ______________________________________________________________________    Interval History   CC: I have a large hole  No chest pain. No dyspnea. Nursing reports looser stools.    -Data reviewed today: I reviewed all new labs and imaging results over the last 24 hours. I personally reviewed see below.    Physical Exam   Temp: 99.1  F (37.3  C) Temp src: Tympanic BP: (!) 144/88 Pulse: 76   Resp: 16 SpO2: 98 % O2  Device: None (Room air)    Vitals:    07/20/22 0100 07/21/22 0600 07/22/22 0600   Weight: 87.7 kg (193 lb 6.4 oz) 86.1 kg (189 lb 12.8 oz) 84.1 kg (185 lb 8 oz)     Vital Signs with Ranges  Temp:  [99.1  F (37.3  C)-99.4  F (37.4  C)] 99.1  F (37.3  C)  Pulse:  [71-76] 76  Resp:  [16] 16  BP: (137-146)/(78-88) 144/88  SpO2:  [98 %-99 %] 98 %  I/O last 3 completed shifts:  In: 2162 [I.V.:2162]  Out: 1250 [Urine:1250]    Gen: Alert, NAD.  HEENT: MMM  Neck: Supple  CV: RRR no m/r/g  Pulm: CTAB, no w/r/r. No increased work of breathing  Msk: No LE edema  Neuro: Grossly intact  Skin: No concerning lesions.      Medications       cefuroxime  500 mg Oral Q12H [facility] (08/20)     enoxaparin ANTICOAGULANT  40 mg Subcutaneous Q24H     FLUoxetine  30 mg Oral Daily     latanoprost  1 drop Both Eyes At Bedtime     loratadine  10 mg Oral Daily     OLANZapine  10 mg Oral At Bedtime     polyethylene glycol  17 g Oral Daily     senna-docusate  1 tablet Oral BID       Data   Recent Labs   Lab 07/21/22  0638 07/20/22  0651 07/20/22  0621 07/19/22  0729 07/19/22  0638 07/19/22  0550 07/18/22  1455   WBC  --  8.3  --   --   --  12.8* 20.6*   HGB  --  11.0*  --   --   --  10.6* 11.8*   MCV  --  87  --   --   --  88 87    298  --   --   --  315 341   NA  --  135  --   --   --  135 133*   POTASSIUM  --  4.1  --   --   --  4.1 4.1   CHLORIDE  --  102  --   --   --  102 99   CO2  --  27  --   --   --  27 26   BUN  --  17  --   --   --  21 23   CR  --  1.07  --   --   --  0.91 0.93   ANIONGAP  --  6  --   --   --  6 8   GISELA  --  8.5*  --   --   --  8.7 9.1   GLC  --  96 101* 110*   < > 111* 201*    < > = values in this interval not displayed.       No results found for this or any previous visit (from the past 24 hour(s)).

## 2022-07-23 LAB
ANION GAP SERPL CALCULATED.3IONS-SCNC: 9 MMOL/L (ref 3–14)
BACTERIA BLD CULT: NO GROWTH
BACTERIA BLD CULT: NO GROWTH
BUN SERPL-MCNC: 20 MG/DL (ref 7–25)
CALCIUM SERPL-MCNC: 8.8 MG/DL (ref 8.6–10.3)
CHLORIDE BLD-SCNC: 101 MMOL/L (ref 98–107)
CO2 SERPL-SCNC: 25 MMOL/L (ref 21–31)
CREAT SERPL-MCNC: 0.97 MG/DL (ref 0.7–1.3)
CRP SERPL-MCNC: 7 MG/L
ERYTHROCYTE [DISTWIDTH] IN BLOOD BY AUTOMATED COUNT: 13.8 % (ref 10–15)
GFR SERPL CREATININE-BSD FRML MDRD: 80 ML/MIN/1.73M2
GLUCOSE BLD-MCNC: 121 MG/DL (ref 70–105)
HCT VFR BLD AUTO: 37.7 % (ref 40–53)
HGB BLD-MCNC: 12.3 G/DL (ref 13.3–17.7)
MCH RBC QN AUTO: 28.1 PG (ref 26.5–33)
MCHC RBC AUTO-ENTMCNC: 32.6 G/DL (ref 31.5–36.5)
MCV RBC AUTO: 86 FL (ref 78–100)
PLATELET # BLD AUTO: 295 10E3/UL (ref 150–450)
POTASSIUM BLD-SCNC: 3.7 MMOL/L (ref 3.5–5.1)
RBC # BLD AUTO: 4.37 10E6/UL (ref 4.4–5.9)
SODIUM SERPL-SCNC: 135 MMOL/L (ref 134–144)
WBC # BLD AUTO: 7.9 10E3/UL (ref 4–11)

## 2022-07-23 PROCEDURE — 250N000013 HC RX MED GY IP 250 OP 250 PS 637: Performed by: INTERNAL MEDICINE

## 2022-07-23 PROCEDURE — 85027 COMPLETE CBC AUTOMATED: CPT | Performed by: INTERNAL MEDICINE

## 2022-07-23 PROCEDURE — 99231 SBSQ HOSP IP/OBS SF/LOW 25: CPT | Mod: 25 | Performed by: SURGERY

## 2022-07-23 PROCEDURE — 250N000013 HC RX MED GY IP 250 OP 250 PS 637: Performed by: SURGERY

## 2022-07-23 PROCEDURE — 86140 C-REACTIVE PROTEIN: CPT | Performed by: INTERNAL MEDICINE

## 2022-07-23 PROCEDURE — 250N000013 HC RX MED GY IP 250 OP 250 PS 637: Performed by: FAMILY MEDICINE

## 2022-07-23 PROCEDURE — 120N000001 HC R&B MED SURG/OB

## 2022-07-23 PROCEDURE — 80048 BASIC METABOLIC PNL TOTAL CA: CPT | Performed by: INTERNAL MEDICINE

## 2022-07-23 PROCEDURE — 36415 COLL VENOUS BLD VENIPUNCTURE: CPT | Performed by: INTERNAL MEDICINE

## 2022-07-23 PROCEDURE — 250N000011 HC RX IP 250 OP 636: Performed by: SURGERY

## 2022-07-23 RX ADMIN — FLUOXETINE 30 MG: 20 CAPSULE ORAL at 10:25

## 2022-07-23 RX ADMIN — ENOXAPARIN SODIUM 40 MG: 40 INJECTION SUBCUTANEOUS at 22:37

## 2022-07-23 RX ADMIN — ACETAMINOPHEN 650 MG: 325 TABLET, FILM COATED ORAL at 20:24

## 2022-07-23 RX ADMIN — LORATADINE 10 MG: 10 TABLET ORAL at 10:22

## 2022-07-23 RX ADMIN — OLANZAPINE 10 MG: 2.5 TABLET ORAL at 22:31

## 2022-07-23 RX ADMIN — Medication 250 MG: at 22:32

## 2022-07-23 RX ADMIN — CEFUROXIME AXETIL 500 MG: 250 TABLET, FILM COATED ORAL at 10:22

## 2022-07-23 RX ADMIN — CEFUROXIME AXETIL 500 MG: 250 TABLET, FILM COATED ORAL at 22:32

## 2022-07-23 RX ADMIN — Medication 250 MG: at 10:22

## 2022-07-23 RX ADMIN — LATANOPROST 1 DROP: 50 SOLUTION/ DROPS OPHTHALMIC at 22:39

## 2022-07-23 RX ADMIN — OXYCODONE HYDROCHLORIDE 10 MG: 5 TABLET ORAL at 20:23

## 2022-07-23 ASSESSMENT — ACTIVITIES OF DAILY LIVING (ADL)
ADLS_ACUITY_SCORE: 57
ADLS_ACUITY_SCORE: 53
ADLS_ACUITY_SCORE: 55
ADLS_ACUITY_SCORE: 57
ADLS_ACUITY_SCORE: 55
ADLS_ACUITY_SCORE: 55
ADLS_ACUITY_SCORE: 53
ADLS_ACUITY_SCORE: 55
ADLS_ACUITY_SCORE: 51
ADLS_ACUITY_SCORE: 51
ADLS_ACUITY_SCORE: 53
ADLS_ACUITY_SCORE: 55

## 2022-07-23 NOTE — PROGRESS NOTES
GENERAL SURGERY PROGRESS NOTE  7/23/2022      Interval history:   No acute events overnight.  Complains of pain in the left buttock.  Continues to be incontinent of urine and loose stools.    Physical Exam:   Vital signs are reviewed and there are no significant abnormalities.     UOP over past 24 hours is 1500 mL  Bm x3    General: laying in bed, appears comfortable.   Skin: wound on left buttock with clean base - no exudate, no granualtion tissue yet. 11 x 7 x 1.5 cm, there is no tunneling or undermining. No evidence of infection spreading.     Wound cultures are growing E. coli and staph aureus     No new imaging       Assessment / Plan:   Jimmy Marcus is a 76 year old male who is POD#5 from debridement of left buttock necrotizing soft tissue infection secondary to decubitus ulcer.     Continue oral cefuroxime  We will place wound VAC today  Regular diet OK  Appreciate cares per medicine   Likely discharge Monday 7/25       ANTONIA Palma MD   7/23/2022

## 2022-07-23 NOTE — PLAN OF CARE
Patient admitted with pressure ulcer to left buttock.  He is alert but confused.  He did get up to the chair today, his bed alarm went off and by the time staff got to the room he was up.  His vitals have been stable with a low grade fever..  He did remove his wound vac after placed by Dr. Palma, it was replaced by nursing.  Will follow.    Problem: Plan of Care - These are the overarching goals to be used throughout the patient stay.    Goal: Patient-Specific Goal (Individualized)  Flowsheets (Taken 7/23/2022 1735)  Individualized Care Needs: wound vac therapy  Goal: Absence of Hospital-Acquired Illness or Injury  Intervention: Identify and Manage Fall Risk  Recent Flowsheet Documentation  Taken 7/23/2022 1617 by Sabrina Davenport RN  Safety Promotion/Fall Prevention: safety round/check completed  Taken 7/23/2022 0715 by Sabrina Davenport, [place]  Safety Promotion/Fall Prevention: safety round/check completed     Problem: Pain Acute  Goal: Acceptable Pain Control and Functional Ability  Intervention: Prevent or Manage Pain  Recent Flowsheet Documentation  Taken 7/23/2022 1617 by Sabrina Davenport, RN  Medication Review/Management: medications reviewed  Taken 7/23/2022 0715 by Sabrina Davenport, RN  Medication Review/Management: medications reviewed   Goal Outcome Evaluation:

## 2022-07-23 NOTE — PLAN OF CARE
"Goal Outcome Evaluation:    Plan of Care Reviewed With: patient     Overall Patient Progress: no change    Low grade temps, all other vss. Dressing on left buttock changed, new wet to dry placed, moderate serosanguinous drainage.   Pt agitated , refused repositioning at times, \"wants to sleep\". Pt incontinent of urine, primo fit in place. Pt denies pain, will continue to monitor.  Problem: Plan of Care - These are the overarching goals to be used throughout the patient stay.    /78   Pulse 86   Temp 99.4  F (37.4  C) (Tympanic)   Resp 16   Ht 1.829 m (6')   Wt 82.4 kg (181 lb 9.6 oz)   SpO2 97%   BMI 24.63 kg/m        Goal: Patient-Specific Goal (Individualized)  7/23/2022 0120 by Aurea Shabazz RN  Flowsheets (Taken 7/23/2022 0120)  Individualized Care Needs: pressure ulcer to left buttock  Patient-Specific Goals (Include Timeframe): pain control and bid dressing change    Problem: Plan of Care - These are the overarching goals to be used throughout the patient stay.    Goal: Absence of Hospital-Acquired Illness or Injury  Intervention: Prevent Skin Injury  Recent Flowsheet Documentation  Taken 7/23/2022 0400 by Aurea Shabazz RN  Body Position:   supine   heels elevated  Skin Protection: incontinence pads utilized  Taken 7/22/2022 2200 by Aurea Shabazz RN  Body Position:   log-rolled   supine, head elevated  Skin Protection: incontinence pads utilized  Taken 7/22/2022 1943 by Aurea Shabazz RN  Body Position: refuses positioning       "

## 2022-07-24 LAB
ANION GAP SERPL CALCULATED.3IONS-SCNC: 5 MMOL/L (ref 3–14)
BUN SERPL-MCNC: 21 MG/DL (ref 7–25)
CALCIUM SERPL-MCNC: 8.7 MG/DL (ref 8.6–10.3)
CHLORIDE BLD-SCNC: 103 MMOL/L (ref 98–107)
CO2 SERPL-SCNC: 26 MMOL/L (ref 21–31)
CREAT SERPL-MCNC: 1 MG/DL (ref 0.7–1.3)
ERYTHROCYTE [DISTWIDTH] IN BLOOD BY AUTOMATED COUNT: 13.7 % (ref 10–15)
GFR SERPL CREATININE-BSD FRML MDRD: 78 ML/MIN/1.73M2
GLUCOSE BLD-MCNC: 129 MG/DL (ref 70–105)
HCT VFR BLD AUTO: 35.5 % (ref 40–53)
HGB BLD-MCNC: 11.7 G/DL (ref 13.3–17.7)
MCH RBC QN AUTO: 28.5 PG (ref 26.5–33)
MCHC RBC AUTO-ENTMCNC: 33 G/DL (ref 31.5–36.5)
MCV RBC AUTO: 87 FL (ref 78–100)
PLATELET # BLD AUTO: 278 10E3/UL (ref 150–450)
POTASSIUM BLD-SCNC: 4 MMOL/L (ref 3.5–5.1)
RBC # BLD AUTO: 4.1 10E6/UL (ref 4.4–5.9)
SODIUM SERPL-SCNC: 134 MMOL/L (ref 134–144)
WBC # BLD AUTO: 7.2 10E3/UL (ref 4–11)

## 2022-07-24 PROCEDURE — 250N000013 HC RX MED GY IP 250 OP 250 PS 637: Performed by: SURGERY

## 2022-07-24 PROCEDURE — 120N000001 HC R&B MED SURG/OB

## 2022-07-24 PROCEDURE — 250N000013 HC RX MED GY IP 250 OP 250 PS 637: Performed by: INTERNAL MEDICINE

## 2022-07-24 PROCEDURE — 250N000011 HC RX IP 250 OP 636: Performed by: SURGERY

## 2022-07-24 PROCEDURE — 80048 BASIC METABOLIC PNL TOTAL CA: CPT | Performed by: INTERNAL MEDICINE

## 2022-07-24 PROCEDURE — 36415 COLL VENOUS BLD VENIPUNCTURE: CPT | Performed by: INTERNAL MEDICINE

## 2022-07-24 PROCEDURE — 250N000013 HC RX MED GY IP 250 OP 250 PS 637: Performed by: FAMILY MEDICINE

## 2022-07-24 PROCEDURE — 85027 COMPLETE CBC AUTOMATED: CPT | Performed by: INTERNAL MEDICINE

## 2022-07-24 RX ADMIN — FLUOXETINE 30 MG: 20 CAPSULE ORAL at 10:31

## 2022-07-24 RX ADMIN — Medication 250 MG: at 10:31

## 2022-07-24 RX ADMIN — OXYCODONE HYDROCHLORIDE 10 MG: 5 TABLET ORAL at 04:40

## 2022-07-24 RX ADMIN — CEFUROXIME AXETIL 500 MG: 250 TABLET, FILM COATED ORAL at 10:31

## 2022-07-24 RX ADMIN — LATANOPROST 1 DROP: 50 SOLUTION/ DROPS OPHTHALMIC at 22:22

## 2022-07-24 RX ADMIN — LORATADINE 10 MG: 10 TABLET ORAL at 10:31

## 2022-07-24 RX ADMIN — OXYCODONE HYDROCHLORIDE 10 MG: 5 TABLET ORAL at 15:39

## 2022-07-24 RX ADMIN — SENNOSIDES AND DOCUSATE SODIUM 1 TABLET: 50; 8.6 TABLET ORAL at 22:23

## 2022-07-24 RX ADMIN — OLANZAPINE 10 MG: 2.5 TABLET ORAL at 22:23

## 2022-07-24 RX ADMIN — Medication 250 MG: at 22:23

## 2022-07-24 RX ADMIN — CEFUROXIME AXETIL 500 MG: 250 TABLET, FILM COATED ORAL at 22:22

## 2022-07-24 RX ADMIN — ENOXAPARIN SODIUM 40 MG: 40 INJECTION SUBCUTANEOUS at 22:22

## 2022-07-24 ASSESSMENT — ACTIVITIES OF DAILY LIVING (ADL)
ADLS_ACUITY_SCORE: 57
ADLS_ACUITY_SCORE: 55
ADLS_ACUITY_SCORE: 57
ADLS_ACUITY_SCORE: 55
ADLS_ACUITY_SCORE: 53
ADLS_ACUITY_SCORE: 55
ADLS_ACUITY_SCORE: 53
ADLS_ACUITY_SCORE: 53
ADLS_ACUITY_SCORE: 57
ADLS_ACUITY_SCORE: 55

## 2022-07-24 NOTE — PLAN OF CARE
Pt did not void all shift, bladder scan over 600 mls, straight cath 350 ml clear straw colored urine.  Wound vac reported blockage, dressing removed, large baseball size clot at base off wound, wet to dry placed to cover wound. Wound vac not to be replaced per Md Palma.            Goal Outcome Evaluation:    Plan of Care Reviewed With: patient     Overall Patient Progress: no change     Low grade temp, prn tylenol given, all other vss. Crackles heard in RLL, O2 98-99%. Pt reported generalized abdominal pain, PRN oxycodone given. No loose stools this shift. Wound vac intact, moderate thin bright red drainage with clots, cannister changed.    Pt incontinent of urine, CCRQ2 hours, will continue to monitor.    Problem: Plan of Care - These are the overarching goals to be used throughout the patient stay.    Goal: Patient-Specific Goal (Individualized)  7/23/2022 2306 by Aurea Shabazz, RN  Flowsheets (Taken 7/23/2022 2306)  Individualized Care Needs: wound vac on left buttock  Patient-Specific Goals (Include Timeframe): treat pain as needed      /78   Pulse 87   Temp 98.4  F (36.9  C) (Tympanic)   Resp 16   Ht 1.829 m (6')   Wt 83.1 kg (183 lb 3.2 oz)   SpO2 99%   BMI 24.85 kg/m

## 2022-07-24 NOTE — PLAN OF CARE
No new changes this shift.  He did remove his dressing this afternoon, new dressing applied.  VSS with low grade temp and some hypertension.  He is alert and confused and did not get out of bed today.  Will follow.    Problem: Plan of Care - These are the overarching goals to be used throughout the patient stay.    Goal: Patient-Specific Goal (Individualized)  Flowsheets (Taken 7/24/2022 1720)  Individualized Care Needs: wound care  Goal: Absence of Hospital-Acquired Illness or Injury  Intervention: Identify and Manage Fall Risk  Recent Flowsheet Documentation  Taken 7/24/2022 1541 by Sabrina Davenport RN  Safety Promotion/Fall Prevention: safety round/check completed  Taken 7/24/2022 0752 by Sabrina Davenport RN  Safety Promotion/Fall Prevention: safety round/check completed  Intervention: Prevent Infection  Recent Flowsheet Documentation  Taken 7/24/2022 1541 by Sabrina Davenport RN  Infection Prevention:   single patient room provided   rest/sleep promoted  Taken 7/24/2022 0752 by Sabrina Davenport RN  Infection Prevention:   single patient room provided   rest/sleep promoted     Problem: Pain Acute  Goal: Acceptable Pain Control and Functional Ability  Intervention: Prevent or Manage Pain  Recent Flowsheet Documentation  Taken 7/24/2022 1541 by Sabrina Davenport RN  Medication Review/Management: medications reviewed  Taken 7/24/2022 0752 by Sabrina Davenport RN  Medication Review/Management: medications reviewed   Goal Outcome Evaluation:

## 2022-07-25 LAB
ANION GAP SERPL CALCULATED.3IONS-SCNC: 5 MMOL/L (ref 3–14)
BUN SERPL-MCNC: 25 MG/DL (ref 7–25)
CALCIUM SERPL-MCNC: 8.6 MG/DL (ref 8.6–10.3)
CHLORIDE BLD-SCNC: 104 MMOL/L (ref 98–107)
CO2 SERPL-SCNC: 25 MMOL/L (ref 21–31)
CREAT SERPL-MCNC: 1.06 MG/DL (ref 0.7–1.3)
ERYTHROCYTE [DISTWIDTH] IN BLOOD BY AUTOMATED COUNT: 13.7 % (ref 10–15)
GFR SERPL CREATININE-BSD FRML MDRD: 72 ML/MIN/1.73M2
GLUCOSE BLD-MCNC: 117 MG/DL (ref 70–105)
GLUCOSE BLDC GLUCOMTR-MCNC: 105 MG/DL (ref 70–99)
HCT VFR BLD AUTO: 32 % (ref 40–53)
HGB BLD-MCNC: 10.6 G/DL (ref 13.3–17.7)
MCH RBC QN AUTO: 28.8 PG (ref 26.5–33)
MCHC RBC AUTO-ENTMCNC: 33.1 G/DL (ref 31.5–36.5)
MCV RBC AUTO: 87 FL (ref 78–100)
PLATELET # BLD AUTO: 268 10E3/UL (ref 150–450)
POTASSIUM BLD-SCNC: 3.9 MMOL/L (ref 3.5–5.1)
RBC # BLD AUTO: 3.68 10E6/UL (ref 4.4–5.9)
SODIUM SERPL-SCNC: 134 MMOL/L (ref 134–144)
WBC # BLD AUTO: 7.9 10E3/UL (ref 4–11)

## 2022-07-25 PROCEDURE — 250N000013 HC RX MED GY IP 250 OP 250 PS 637: Performed by: SURGERY

## 2022-07-25 PROCEDURE — 80048 BASIC METABOLIC PNL TOTAL CA: CPT | Performed by: INTERNAL MEDICINE

## 2022-07-25 PROCEDURE — 120N000001 HC R&B MED SURG/OB

## 2022-07-25 PROCEDURE — 36415 COLL VENOUS BLD VENIPUNCTURE: CPT | Performed by: INTERNAL MEDICINE

## 2022-07-25 PROCEDURE — 250N000013 HC RX MED GY IP 250 OP 250 PS 637: Performed by: INTERNAL MEDICINE

## 2022-07-25 PROCEDURE — 99231 SBSQ HOSP IP/OBS SF/LOW 25: CPT | Mod: 25 | Performed by: SURGERY

## 2022-07-25 PROCEDURE — 85027 COMPLETE CBC AUTOMATED: CPT | Performed by: INTERNAL MEDICINE

## 2022-07-25 PROCEDURE — 250N000011 HC RX IP 250 OP 636: Performed by: SURGERY

## 2022-07-25 PROCEDURE — 250N000013 HC RX MED GY IP 250 OP 250 PS 637: Performed by: FAMILY MEDICINE

## 2022-07-25 RX ADMIN — OLANZAPINE 10 MG: 2.5 TABLET ORAL at 21:41

## 2022-07-25 RX ADMIN — LATANOPROST 1 DROP: 50 SOLUTION/ DROPS OPHTHALMIC at 21:42

## 2022-07-25 RX ADMIN — CEFUROXIME AXETIL 500 MG: 250 TABLET, FILM COATED ORAL at 21:41

## 2022-07-25 RX ADMIN — CEFUROXIME AXETIL 500 MG: 250 TABLET, FILM COATED ORAL at 11:10

## 2022-07-25 RX ADMIN — SENNOSIDES AND DOCUSATE SODIUM 1 TABLET: 50; 8.6 TABLET ORAL at 21:41

## 2022-07-25 RX ADMIN — FLUOXETINE 30 MG: 20 CAPSULE ORAL at 11:10

## 2022-07-25 RX ADMIN — ENOXAPARIN SODIUM 40 MG: 40 INJECTION SUBCUTANEOUS at 21:41

## 2022-07-25 RX ADMIN — Medication 250 MG: at 21:41

## 2022-07-25 RX ADMIN — Medication 250 MG: at 11:10

## 2022-07-25 RX ADMIN — LORATADINE 10 MG: 10 TABLET ORAL at 11:10

## 2022-07-25 ASSESSMENT — ACTIVITIES OF DAILY LIVING (ADL)
ADLS_ACUITY_SCORE: 55
ADLS_ACUITY_SCORE: 57
ADLS_ACUITY_SCORE: 57
ADLS_ACUITY_SCORE: 56
ADLS_ACUITY_SCORE: 56
ADLS_ACUITY_SCORE: 55
ADLS_ACUITY_SCORE: 56
ADLS_ACUITY_SCORE: 57
ADLS_ACUITY_SCORE: 55
ADLS_ACUITY_SCORE: 55

## 2022-07-25 NOTE — PLAN OF CARE
Patient removed dressing from left buttock area early in shift, replaced dressing with wet to dry, taped and covered with mesh panties over regular brief.  Patient intermittently confused, watched TV and slept well overnight. Low grade temp, otherwise VSS.  Due to void, if no void will bladder scan.    /60 (BP Location: Left arm, Patient Position: Right side, Cuff Size: Adult Regular)   Pulse 83   Temp 99.7  F (37.6  C) (Tympanic)   Resp 16   Ht 1.829 m (6')   Wt 79.9 kg (176 lb 1.6 oz)   SpO2 96%   BMI 23.88 kg/m        Goal Outcome Evaluation:    Overall Patient Progress: no change       Problem: Plan of Care - These are the overarching goals to be used throughout the patient stay.    Goal: Plan of Care Review/Shift Note  Description: The Plan of Care Review/Shift note should be completed every shift.  The Outcome Evaluation is a brief statement about your assessment that the patient is improving, declining, or no change.  This information will be displayed automatically on your shift note.  Outcome: Ongoing, Progressing  Flowsheets (Taken 7/25/2022 0348)  Overall Patient Progress: no change  Goal: Patient-Specific Goal (Individualized)  Outcome: Ongoing, Progressing  Flowsheets (Taken 7/25/2022 0348)  Individualized Care Needs: Wound care, discourage patient from removing dressing  Goal: Absence of Hospital-Acquired Illness or Injury  Outcome: Ongoing, Progressing  Intervention: Identify and Manage Fall Risk  Recent Flowsheet Documentation  Taken 7/25/2022 0337 by Sara Hernández, RN  Safety Promotion/Fall Prevention: safety round/check completed  Taken 7/24/2022 2040 by Sara Hernández, RN  Safety Promotion/Fall Prevention:   safety round/check completed   room door open   increased rounding and observation  Intervention: Prevent Skin Injury  Recent Flowsheet Documentation  Taken 7/25/2022 0337 by Sara Hernández, RN  Body Position:   turned   side-lying   right   weight shifting  Taken 7/24/2022  2040 by Sara Hernández, RN  Body Position:   turned   log-rolled   weight shifting   supine, head elevated  Intervention: Prevent and Manage VTE (Venous Thromboembolism) Risk  Recent Flowsheet Documentation  Taken 7/25/2022 0337 by Sara Hernández, RN  Activity Management: activity adjusted per tolerance  Taken 7/24/2022 2040 by Sara Hernández RN  VTE Prevention/Management: SCDs (sequential compression devices) off  Activity Management: activity adjusted per tolerance  Intervention: Prevent Infection  Recent Flowsheet Documentation  Taken 7/25/2022 0337 by Sara Hernández, RN  Infection Prevention:   single patient room provided   rest/sleep promoted  Taken 7/24/2022 2040 by Sara Hernández RN  Infection Prevention:   single patient room provided   rest/sleep promoted  Goal: Optimal Comfort and Wellbeing  Outcome: Ongoing, Progressing  Goal: Readiness for Transition of Care  Outcome: Ongoing, Progressing     Problem: Pain Acute  Goal: Acceptable Pain Control and Functional Ability  Outcome: Ongoing, Progressing  Intervention: Prevent or Manage Pain  Recent Flowsheet Documentation  Taken 7/24/2022 2040 by Sara Hernández RN  Medication Review/Management: medications reviewed     Problem: Confusion Chronic  Goal: Optimal Cognitive Function  Outcome: Ongoing, Progressing

## 2022-07-25 NOTE — PLAN OF CARE
Patient is pleasant. Patient is confused. Patient will pull on catheter but is easily redirectable. Patient has  had loose stools. Vitals are stable. Patient has large wound on left buttocks that is a wet to dry dressing. No new concerns at this time. Will continue to monitor.     Goal Outcome Evaluation:    Plan of Care Reviewed With: patient     Overall Patient Progress: no change

## 2022-07-25 NOTE — PROGRESS NOTES
GENERAL SURGERY PROGRESS NOTE  7/25/2022      Interval history:   No acute events overnight.  Complains of pain in the left buttock.      Physical Exam:   Vital signs are reviewed and there are no significant abnormalities.     UOP over past 24 hours is 450 mL      General: laying in bed, appears comfortable.   Skin: wound on left buttock with some clot at the base.- no exudate, some granualtion tissue at the periphery of the wound. 11 x 7 x 1.5 cm, there is no tunneling or undermining. No evidence of infection spreading.   Roche catheter remains in place draining clear urine    Wound cultures are growing E. coli and staph aureus     No new imaging       Assessment / Plan:   Jimmy Marcus is a 76 year old male who is POD#6 from debridement of left buttock necrotizing soft tissue infection secondary to decubitus ulcer.     Continue oral cefuroxime for full 7-day course, stop tomorrow  Continue wet-to-dry dressings  Regular diet OK  Plan to discharge when accepted at skilled nursing facility or nursing home        ANTONIA Palma MD   7/25/2022

## 2022-07-25 NOTE — PROGRESS NOTES
Placed indwelling maldonado due to no void.  Bladder scan showed 417 ml, maldonado returned 450 ml.  Provider updated.

## 2022-07-25 NOTE — PROGRESS NOTES
:     Call placed to [facility] to discuss patients discharge planning needs. Spoke with CHANNING Hoffman and she stated she would have to talk with her team to discuss whether or not they can meet patients wound care needs. Informed her that patient will not have a wound vac placed and will have a wet to dry dressing.     Informed Beverly at [facility] that patient will not have a wound vac at discharge.     Guardianship paperwork has been sent to Monika Mckenzie.      will continue to follow.     BROOKLYN Steinberg on 7/25/2022 at 9:25 AM     Addendum:     Spoke with Spring from [facility]. They cannot accept patient due to not being able to meet his needs.     Have not yet heard back from [facility] or [facility].     BROOKLYN Steinberg on 7/25/2022 at 1:46 PM     Addendum:     Spoke with Elizabeth at [facility]. Patient currently is receiving services with them. Inquired if [facility] can accommodate wet to dry dressing changes 2x daily for patients wound care needs. Elizabeth stated that they can go to patients AL 3x per week and also provide teaching to staff if needed.     Spoke with CHANNING Hoffman from [facility] and gave her an update regarding the services [facility] can provide. She stated she will have to discuss with her supervisor.     BROOKLYN Steinberg on 7/25/2022 at 3:13 PM

## 2022-07-25 NOTE — PROGRESS NOTES
"Clinical Nutrition / Reassessment     Assessment:   Client History:  Awaiting safe discharge. Appetite has been good the majority of his stay so far. Wound vac was placed, pt removed it and has not been replaced.   Diet Order:  Carb controlled.   Oral Intake:  Staff assist as needed, % most meals   Supplement Intake:  Gelatein protein jello for an additional 20 g protein for wound healing  Weight:   Vitals:    07/18/22 1242 07/19/22 0222 07/20/22 0100 07/21/22 0600   Weight: 82.1 kg (181 lb) 91.3 kg (201 lb 4 oz) 87.7 kg (193 lb 6.4 oz) 86.1 kg (189 lb 12.8 oz)    07/22/22 0600 07/23/22 0101 07/24/22 0106 07/25/22 0200   Weight: 84.1 kg (185 lb 8 oz) 82.4 kg (181 lb 9.6 oz) 83.1 kg (183 lb 3.2 oz) 79.9 kg (176 lb 1.6 oz)   Height: 6' 0\"  BMI: Body mass index is 23.88 kg/m .    Estimated nutritional needs based on:  current body weight  88 kg / 193 lbs   Estimated energy needs:  8053-3855 kcal/day (20-25 kcal/kg)  Estimated protein needs:  106-132 gm/day (1.2-1.5 g/kg)  Estimated fluid needs:  1963-6362 ml/day (1 ml/kcal)    Malnutrition Criteria:  (Need to have 2 indicators to qualify recommendation)  Energy Intake:  Adequate   Interpretation of Weight Loss:  No significant weight loss  Physical Findings:  No significant findings  Reduced  Strength:  Not Measured    Recommended Nutrition Diagnosis:   Not enough Malnutrition indicators found for medical diagnosis      Nutrition Education: Nutrition education will be provided as appropriate.    Nutrition Diagnosis: Protein:  Increased protein needs related to increased demand for protein eg wound healing as evidenced by stage II wound on buttocks.    Intervention:  Nutrition Prescription:     Nutrition Intervention(s):Recommended general, healthful diet  1. Meals and Snacks: carb controlled diet  2. Medical Food Supplement: gelatein protein jello daily     Nutrition Goal(s):  1. Pt will consume 75% or more at meals and supplements- on going  2. Pt will not " have unplanned wt loss during hospitalization- on going  3. Pt will allow staff to assist at meals for greatest intake potential- on going    Monitoring and Evaluation:   Food Intake, diet tolerance, weights, wound     Discharge Recommendation:   Nutrition Discharge Planning  recommend protein supplement on discharge for wound healing. Recommend MVI with minerals daily as well.     RD will reassess in within 1-5 days or sooner.  Madeleine Rivas RD on 7/25/2022 at 1:51 PM

## 2022-07-26 NOTE — PROGRESS NOTES
:     Spoke with Beverly from [facility]. She stated that she will check to see if they are done screening patients referral. Waiting to hear back.    BROOKLYN Steinberg on 7/26/2022 at 9:12 AM     Addendum:     Spoke with Dr. Palma and he stated that patients dressing can be changed 1 time a day. This update was given to [facility] and [facility]. Patient is ready for discharge.     BROOKLYN Steinberg on 7/26/2022 at 10:15 AM     :     Spoke on the phone with RADHA Skelton at [facility]. She stated that they cannot meet patients current needs at this time and are still requesting that patient have SNF placement for his wound care.     BROOKLYN Steinberg on 7/26/2022 at 1:49 PM     :     Spoke on the phone with Beverly from [facility] and she stated that they cannot accept patient this time due to his wound care needs. They stated that they would like to re screen patient when his wound is more stable. Inquired what the qualifications for a stable wound are and she stated she will ask about this and get back to me.     BROOKLYN Steinberg on 7/26/2022 at 2:02 PM     :     A referral has been faxed to [facility], [facility], and [facility].     BROOKLYN Steinberg on 7/26/2022 at 2:55 PM     :     Spoke with Beverly from [facility]. She stated that the patients wound would need to have no signs of infection, have minimal draining, and patient will need to not remove his dressing in order for them to rescreen.     BROOKLYN Steinberg on 7/26/2022 at 3:55 PM

## 2022-07-26 NOTE — PLAN OF CARE
Patient is pleasant. Vitals are stable. Patient has loose stools and fecal incontinence. Patient was given a shower today. Patient's dressing was changed today. No new concerns at this time.    Goal Outcome Evaluation:    Plan of Care Reviewed With: patient     Overall Patient Progress: no change

## 2022-07-26 NOTE — PROGRESS NOTES
SAFETY CHECKLIST  ID Bands and Risk clasps correct and in place (DNR, Fall risk, Allergy, Latex, Limb):  Yes  All Lines Reconciled and labeled correctly: Yes  Whiteboard updated:Yes  Environmental interventions (bed/chair alarm on, call light, side rails, restraints, sitter....): Yes   Verify Tele #: LUIS F Campos RN on 7/25/2022 at 7:15 PM

## 2022-07-26 NOTE — PROGRESS NOTES
GENERAL SURGERY PROGRESS NOTE  7/26/2022      Interval history:   No acute events overnight.  Complains of pain in the left buttock.      Physical Exam:   Vital signs are reviewed and there are no significant abnormalities.     UOP over past 24 hours is 300 mL  BMx1      General: laying in bed, appears comfortable.   Skin: wound on left buttock. Clean base, some granulation tissue forming. 11 x 7 x 1.5 cm, there is no tunneling or undermining. No evidence of infection spreading.   Roche catheter remains in place draining clear urine    Wound cultures are growing E. coli and staph aureus     No new imaging       Assessment / Plan:   Jimmy Marcus is a 76 year old male who is POD#7 from debridement of left buttock necrotizing soft tissue infection secondary to decubitus ulcer.     Stop antibiotics oral cefuroxime   Continue wet-to-dry dressings  Regular diet OK  Plan to discharge when accepted at skilled nursing facility or nursing home        ANTONIA Palma MD   7/26/2022

## 2022-07-27 NOTE — PROGRESS NOTES
:     Call placed to [facility]. Voicemail left.     Call placed to [facility] in [place]. Voicemail left.     Call placed [facility]. Voicemail left.     BROOKLYN Steinberg on 7/27/2022 at 9:10 AM

## 2022-07-27 NOTE — PLAN OF CARE
Patient is pleasant. Vitals are stable. Patient was up to the recliner. Urine dark and concentrated. Roche is patent. Patient pulled on his catheter once and was easily redirected.  Plan is to discharge to [facility] at 1300.    Goal Outcome Evaluation:    Plan of Care Reviewed With: patient     Overall Patient Progress: improving

## 2022-07-27 NOTE — PHARMACY - DISCHARGE MEDICATION RECONCILIATION AND EDUCATION
Pharmacy:  Discharge Counseling and Medication Reconciliation    Jimmy Marcus  [address] [place] [id number]  [phone] (home)   76 year old male  PCP: No Ref-Primary, Physician    Allergies: Peanuts [nuts] and Motrin [ibuprofen]    Discharge Counseling:    Pharmacist did not meet with patient prior to discharge, as patient is being discharged to [facility].    Summary of Education: N/A    Materials Provided:  MedCounselor sheets printed from Clinical Pharmacology on: N/a- patient not prescribed any new medications at discharge.    Discharge Medication Reconciliation:    It has been determined that the patient has an adequate supply of medications available or which can be obtained from [facility]'s preferred pharmacy.    Thank you for the consult.    Faustino Chapman RPH........July 27, 2022 12:19 PM

## 2022-07-27 NOTE — PROGRESS NOTES
Discharge Note      Data:  Jimmy Marcus discharged to nursing home at 1320 via wheel chair. Accompanied by staff.    Action:  Written discharge/follow-up instructions were provided to caregiver. Prescriptions sent to patients preferred pharmacy. All belongings sent with patient.    Response:  Caregiver verbalized understanding of discharge instructions, reason for discharge, and necessary follow-up appointments.

## 2022-07-27 NOTE — DISCHARGE SUMMARY
[facility]  Hospitalist Discharge Summary      Date of Admission:  7/18/2022  Date of Discharge:  7/27/2022  1:15 PM  Discharging Provider: Matthew Jasso MD  Discharge Service: Hospitalist Service    Discharge Diagnoses   Active Problems:    Decubitus ulcer of left buttock, stage 2 (H)    Necrotizing soft tissue infection    Late onset Alzheimer's dementia with behavioral disturbance (H)    Essential hypertension    Type 2 diabetes mellitus without complication, without long-term current use of insulin (H)        Follow-ups Needed After Discharge   Follow-up Appointments     Follow Up and recommended labs and tests      Follow up with wound care service at [facility] in [place], [place] in   one week             Unresulted Labs Ordered in the Past 30 Days of this Admission     No orders found from 6/18/2022 to 7/19/2022.      These results will be followed up by Nursing home provider    Discharge Disposition   Discharged to short-term care facility  Condition at discharge: Satisfactory      Hospital Course    Reason for admission requiring a surgical or invasive procedure:    Necrotizing soft tissue infection [M79.89]  Decubitus ulcer of left buttock, stage 2 (H) [L89.322]   The patient underwent the following procedure(s):    Wound debridement   There were no immediate complications during this procedure.    Please refer to the full operative summary for details    The patient's hospital course was unremarkable.  He recovered as anticipated and experienced no post-operative complications. Urinary retention likely related to narcotics. Did tot tolerate wound VAC. Wound appears to be doing well with normal wet-to-dry dressings. Discharged to SNF on POD # 9 in good condition         Consultations This Hospital Stay   PHARMACY TO DOSE VANCO  HOSPITALIST IP CONSULT  SOCIAL WORK IP CONSULT  PHYSICAL THERAPY ADULT IP CONSULT  OCCUPATIONAL THERAPY ADULT IP CONSULT  SPEECH LANGUAGE PATH ADULT  IP CONSULT    Code Status   No CPR- Do NOT Intubate    Time Spent on this Encounter   I, Matthew Jasso MD, personally saw the patient today and spent greater than 30 minutes discharging this patient.       Matthew Jasso MD  [facility]  [address]  [place] [address]  Phone: [phone]  Fax: [phone]  ______________________________________________________________________    Physical Exam   Vital Signs: Temp: 97.6  F (36.4  C) Temp src: Tympanic BP: 130/68 Pulse: 85   Resp: 20 SpO2: 97 % O2 Device: None (Room air)    Weight: 174 lbs 0 oz  Constitutional: awake, alert, cooperative, no apparent distress, and appears stated age  Respiratory: No increased work of breathing, good air exchange, clear to auscultation bilaterally, no crackles or wheezing  Cardiovascular: regular rate and rhythm  GI: normal bowel sounds, soft and non-distended  Skin: healing wound over left post buttoc, some granulation tissues       Primary Care Physician   Physician No Ref-Primary    Discharge Orders      Wound Care Referral      General info for SNF    Length of Stay Estimate: Short Term Care: Estimated # of Days <30  Condition at Discharge: Improving  Level of care:skilled   Rehabilitation Potential: Good  Admission H&P remains valid and up-to-date: Yes  Recent Chemotherapy: N/A  Use Nursing Home Standing Orders: Yes     Follow Up and recommended labs and tests    Follow up with wound care service at [facility] in [place], [place] in one week     Reason for your hospital stay    Recovery from debridement of infected decubitus ulcer, left buttock     Activity - Up with nursing assistance     General info for SNF    Length of Stay Estimate: Short Term Care: Estimated # of Days <30  Condition at Discharge: Improving  Level of care:skilled   Rehabilitation Potential: Fair  Admission H&P remains valid and up-to-date: Yes  Recent Chemotherapy: N/A  Use Nursing Home Standing Orders: Yes      Mantoux instructions    Give two-step Mantoux (PPD) Per Facility Policy Yes     Reason for your hospital stay    Admitted from Assisted Living facility with open wound on hip needing surgery     Wound care (specify)    Site:   Hip wound  Instructions:  wet to dry dressing change daily     No CPR- Do NOT Intubate     Physical Therapy Adult Consult    Evaluate and treat as clinically indicated.    Reason:  strengthening, hope to return to assisted living     Occupational Therapy Adult Consult    Evaluate and treat as clinically indicated.    Reason:  dementia, hope to return to assisted living     Speech Language Path Adult Consult    Evaluate and treat as clinically indicated.    Reason:  evaluate for swallowing issues     Fall precautions     Diet    Follow this diet upon discharge: Orders Placed This Encounter      Snacks/Supplements Adult: Gelatein Plus; With Meals      Consistent Carbohydrate Diet Moderate Consistent Carb (60 g CHO per Meal) Diet       Significant Results and Procedures   Most Recent 3 CBC's:Recent Labs   Lab Test 07/27/22  0603 07/26/22  0600 07/25/22  0551   WBC 8.3 8.3 7.9   HGB 10.3* 10.5* 10.6*   MCV 88 86 87    265 268     Most Recent 3 BMP's:Recent Labs   Lab Test 07/27/22  0603 07/26/22  2134 07/26/22  0600 07/25/22  2218 07/25/22  0551     --  136  --  134   POTASSIUM 3.9  --  3.7  --  3.9   CHLORIDE 105  --  106  --  104   CO2 27  --  24  --  25   BUN 21  --  25  --  25   CR 0.90  --  0.90  --  1.06   ANIONGAP 6  --  6  --  5   GISELA 8.5*  --  9.0  --  8.6   * 126* 114*   < > 117*    < > = values in this interval not displayed.   ,   Results for orders placed or performed during the hospital encounter of 07/09/19   XR Chest Port 1 View    Narrative    PROCEDURE:  XR CHEST PORT 1 VW    HISTORY:  Palpitations.     COMPARISON:  None.    FINDINGS:   The cardiac silhouette is normal in size. The pulmonary vasculature is  normal.  The lungs are clear. No pleural effusion  or pneumothorax.      Impression    IMPRESSION:  No acute cardiopulmonary disease.      KILLIAN TROTTER MD       Discharge Medications   Discharge Medication List as of 7/27/2022  1:12 PM      CONTINUE these medications which have NOT CHANGED    Details   acetaminophen (TYLENOL) 325 MG tablet Take 975 mg by mouth 3 times daily as needed for mild pain, Historical      alum & mag hydroxide-simethicone (MAALOX  ES) 400-400-40 MG/5ML SUSP suspension Take 10 mLs by mouth 4 times daily as needed, Historical      aspirin (ASA) 81 MG EC tablet Take 81 mg by mouth daily, Historical      bacitracin 500 UNIT/GM OINT Apply topically daily as neededHistorical      cetirizine (ZYRTEC) 10 MG tablet Take 10 mg by mouth daily, Historical      emollient (VANICREAM) external cream Apply topically dailyHistorical      EPINEPHrine (SYMJEPI) 0.3 MG/0.3ML Inject 0.3 mg into the muscle once as needed (anaphylaxis), Historical      !! FLUoxetine (PROZAC) 10 MG capsule Take 10 mg by mouth daily . Take with 20 mg capsule to = 30 mg/day., Historical      !! FLUoxetine (PROZAC) 20 MG capsule Take 20 mg by mouth daily . Take with 10 mg capsule to = 30 mg/day., Historical      hydrocortisone 2.5 % cream Apply topically 2 times daily (Behind knees and thighs)Historical      hydrOXYzine (ATARAX) 10 MG tablet Take 10 mg by mouth every 8 hours as needed, Historical      latanoprost (XALATAN) 0.005 % ophthalmic solution Place 1 drop into both eyes At Bedtime, Historical      Lidocaine (LIDOCARE) 4 % Patch Place 1 patch onto the skin daily as needed for moderate pain To prevent lidocaine toxicity, patient should be patch free for 12 hrs daily.Historical      loperamide (IMODIUM A-D) 2 MG tablet Take as needed for diarrhea. Take 4 mg by mouth x1 after first loose stool, may take additional 2 mg as needed after each subsequent loose stool, up to max of 8 mg/day., Historical      magnesium hydroxide (MILK OF MAGNESIA) 400 MG/5ML suspension Take 30 mLs by  mouth daily as needed for constipation or heartburn, Historical      magnesium sulfate (EPSOM SALT) GRAN granules Apply topically once a week (Foot soak), Historical      nystatin (MYCOSTATIN) 180650 UNIT/GM external powder Apply topically 2 times daily as neededHistorical      OLANZapine (ZYPREXA) 10 MG tablet Take 10 mg by mouth daily, Historical      polyethylene glycol (MIRALAX/GLYCOLAX) packet Take 1 packet by mouth daily, Historical      Skin Protectants, Misc. (ABSORBASE) OINT Externally apply topically daily as needed (legs and feet)Historical      sucralfate (CARAFATE) 1 GM tablet Take 1 g by mouth 2 times daily (before meals), Historical      vitamin D3 (CHOLECALCIFEROL) 50 mcg (2000 units) tablet Take 1 tablet by mouth 2 times daily, Historical       !! - Potential duplicate medications found. Please discuss with provider.      STOP taking these medications       predniSONE (DELTASONE) 20 MG tablet Comments:   Reason for Stopping:             Allergies   Allergies   Allergen Reactions     Peanuts [Nuts] Anaphylaxis     Motrin [Ibuprofen]

## 2022-07-27 NOTE — PLAN OF CARE
Patient dressing changed with moderate amount serosanguinous drainage to dressing, wound bed is pink/red with minimal slough in some places and with some granulation tissue present.  No new skin breakdown this shift, patient on air mattress and turned and repositioned if needed.  Bed alarms on, room next to nurses station as patient will try to take of dressing or pull at maldonado.  Maldonado draining suhas/red urine.   Patient bladder scanned with 31 mL in bladder, maldonado patent.  VSS, afebrile.       Problem: Plan of Care - These are the overarching goals to be used throughout the patient stay.    Goal: Plan of Care Review/Shift Note    Outcome: Ongoing, Progressing  Flowsheets (Taken 7/27/2022 0521)  Plan of Care Reviewed With: patient  Overall Patient Progress: improving  Goal: Absence of Hospital-Acquired Illness or Injury  Intervention: Identify and Manage Fall Risk  Recent Flowsheet Documentation  Taken 7/27/2022 0230 by Dianne Villarreal, RN  Safety Promotion/Fall Prevention:    activity supervised    assistive device/personal items within reach    bed alarm on    clutter free environment maintained    fall prevention program maintained    patient and family education    room door open    room near nurse's station    room organization consistent    safety round/check completed    supervised activity    treat reversible contributory factors    treat underlying cause  Taken 7/26/2022 2015 by Dianne Villarreal, RN  Safety Promotion/Fall Prevention:    activity supervised    assistive device/personal items within reach    bed alarm on    clutter free environment maintained    fall prevention program maintained    patient and family education    room door open    room near nurse's station    room organization consistent    safety round/check completed    supervised activity    treat reversible contributory factors    treat underlying cause  Intervention: Prevent and Manage VTE (Venous Thromboembolism) Risk  Recent  Flowsheet Documentation  Taken 7/27/2022 0230 by Dianne Villarreal RN  VTE Prevention/Management: (lovenox) other (see comments)  Taken 7/26/2022 2015 by Dianne Villarreal RN  VTE Prevention/Management: (lovenox) other (see comments)  Intervention: Prevent Infection  Recent Flowsheet Documentation  Taken 7/27/2022 0230 by Dianne Villarreal RN  Infection Prevention:    hand hygiene promoted    rest/sleep promoted    single patient room provided  Taken 7/26/2022 2015 by Dianne Villarreal RN  Infection Prevention:    hand hygiene promoted    rest/sleep promoted    single patient room provided     Problem: Pain Acute  Goal: Acceptable Pain Control and Functional Ability  Intervention: Prevent or Manage Pain  Recent Flowsheet Documentation  Taken 7/27/2022 0230 by Dianne Villarreal RN  Medication Review/Management:    medications reviewed    high-risk medications identified  Taken 7/26/2022 2015 by Dianne Villarreal RN  Medication Review/Management:    medications reviewed    high-risk medications identified   Goal Outcome Evaluation:    Plan of Care Reviewed With: patient     Overall Patient Progress: improving

## 2022-07-27 NOTE — PROGRESS NOTES
:     Spoke on the phone with Riki from [facility] and she stated that they can accept patient today. She asked if patient could get second booster shot so he does not have to quarantine.     Call placed to [facility] transport and a ride has been set up for 1300.     Spoke with patients guardian at [facility] and she gave the okay to patients discharge plan. She also stated that patient can get second COVID booster so he does not have to be quarantined.     Call placed to [facility] to update on patients discharge plan.     PAS completed: [id number]    BROOKLYN Steinberg on 7/27/2022 at 10:49 AM    Addendum:     Accessed patients chart to receive discharge summary and fax to SNF.     BROOKLYN Steinberg on 7/27/2022 at 3:08 PM

## 2022-07-27 NOTE — DISCHARGE SUMMARY
[facility] Discharge Summary    Jimmy Marcus MRN# [id number]   Age: 76 year old YOB: 1945     Date of Admission:  7/18/2022  Date of Discharge::  7/27/2022  1:15 PM  Admitting Physician:  Adonay Palma MD  Discharge Physician:  Adonay Palma MD     Home clinic: unknown          Admission Diagnoses:   Cellulitis of left buttock [L03.317]  Decubitus ulcer of left buttock, stage 2 (H) [L89.322]  Necrotizing soft tissue infection [M79.89]          Discharge Diagnosis:     Necrotizing soft tissue infection [M79.89]  Decubitus ulcer of left buttock, stage 2 (H) [L89.322]          Procedures:     Procedure(s): Debridement of left buttock wound                   Medications Prior to Admission:     No medications prior to admission.             Discharge Medications:     Discharge Medication List as of 7/27/2022  1:12 PM      CONTINUE these medications which have NOT CHANGED    Details   acetaminophen (TYLENOL) 325 MG tablet Take 975 mg by mouth 3 times daily as needed for mild pain, Historical      alum & mag hydroxide-simethicone (MAALOX  ES) 400-400-40 MG/5ML SUSP suspension Take 10 mLs by mouth 4 times daily as needed, Historical      aspirin (ASA) 81 MG EC tablet Take 81 mg by mouth daily, Historical      bacitracin 500 UNIT/GM OINT Apply topically daily as neededHistorical      cetirizine (ZYRTEC) 10 MG tablet Take 10 mg by mouth daily, Historical      emollient (VANICREAM) external cream Apply topically dailyHistorical      EPINEPHrine (SYMJEPI) 0.3 MG/0.3ML Inject 0.3 mg into the muscle once as needed (anaphylaxis), Historical      !! FLUoxetine (PROZAC) 10 MG capsule Take 10 mg by mouth daily . Take with 20 mg capsule to = 30 mg/day., Historical      !! FLUoxetine (PROZAC) 20 MG capsule Take 20 mg by mouth daily . Take with 10 mg capsule to = 30 mg/day., Historical      hydrocortisone 2.5 % cream Apply topically 2 times daily (Behind knees and  thighs)Historical      hydrOXYzine (ATARAX) 10 MG tablet Take 10 mg by mouth every 8 hours as needed, Historical      latanoprost (XALATAN) 0.005 % ophthalmic solution Place 1 drop into both eyes At Bedtime, Historical      Lidocaine (LIDOCARE) 4 % Patch Place 1 patch onto the skin daily as needed for moderate pain To prevent lidocaine toxicity, patient should be patch free for 12 hrs daily.Historical      loperamide (IMODIUM A-D) 2 MG tablet Take as needed for diarrhea. Take 4 mg by mouth x1 after first loose stool, may take additional 2 mg as needed after each subsequent loose stool, up to max of 8 mg/day., Historical      magnesium hydroxide (MILK OF MAGNESIA) 400 MG/5ML suspension Take 30 mLs by mouth daily as needed for constipation or heartburn, Historical      magnesium sulfate (EPSOM SALT) GRAN granules Apply topically once a week (Foot soak), Historical      nystatin (MYCOSTATIN) 985623 UNIT/GM external powder Apply topically 2 times daily as neededHistorical      OLANZapine (ZYPREXA) 10 MG tablet Take 10 mg by mouth daily, Historical      polyethylene glycol (MIRALAX/GLYCOLAX) packet Take 1 packet by mouth daily, Historical      Skin Protectants, Misc. (ABSORBASE) OINT Externally apply topically daily as needed (legs and feet)Historical      sucralfate (CARAFATE) 1 GM tablet Take 1 g by mouth 2 times daily (before meals), Historical      vitamin D3 (CHOLECALCIFEROL) 50 mcg (2000 units) tablet Take 1 tablet by mouth 2 times daily, Historical       !! - Potential duplicate medications found. Please discuss with provider.      STOP taking these medications       predniSONE (DELTASONE) 20 MG tablet Comments:   Reason for Stopping:                     Consultations:   Consultation during this admission received from internal medicine          Brief History of Illness:   Reason for admission requiring a surgical or invasive procedure:   Necrotizing soft tissue infection [M79.89]  Decubitus ulcer of left buttock,  stage 2 (H) [L89.322]   The patient underwent the following procedure(s):   Wound debridement   There were no immediate complications during this procedure.    Please refer to the full operative summary for details.             Hospital Course:   The patient's hospital course was unremarkable.  He recovered as anticipated and experienced no post-operative complications. Urinary retention likely related to narcotics. Did tot tolerate wound VAC. Wound appears to be doing well with normal wet-to-dry dressings. Discharged to SNF on POD # 9 in good condition          Discharge Instructions and Follow-Up:     Discharge diet: Regular   Discharge activity: Activity as tolerated  Up with assist    Discharge follow-up: Follow up with wound care in 1 weeks   Wound care: Daily dressing changes           Discharge Disposition:     Discharged to short-term care facility      Attestation:  I have reviewed today's vital signs, notes, medications, labs and imaging.  Amount of time performed on this discharge summary: 15 minutes.    Adonay Palma MD

## 2022-07-28 NOTE — PROGRESS NOTES
Clinic Care Coordination Contact    Patient has PCP elsewhere, no follow-up here. No TCM call required per policy.  [facility].  Celena Lopez RN ,....................  7/28/2022   7:50 AM

## 2022-08-15 NOTE — TELEPHONE ENCOUNTER
Unable to reach nursing staff at [facility] to perform 30-day medication reconciliation. Josie Verdin RN on 8/15/2022 at 10:05 AM

## 2022-08-18 NOTE — TELEPHONE ENCOUNTER
Third and final attempt to contact nursing staff at [facility] in [place]. No answer and unable to leave message. Bernardo Smith RN, BSN  ....................  8/18/2022   10:12 AM

## (undated) DEVICE — ESU GROUND PAD ADULT W/CORD E7507

## (undated) DEVICE — SOL WATER 1500ML

## (undated) DEVICE — SU VICRYL 3-0 SH 27" UND J416H

## (undated) DEVICE — GLOVE BIOGEL PI SZ 8.5 40885

## (undated) DEVICE — GLOVE PROTEXIS POWDER FREE SMT 7.5  2D72PT75X

## (undated) DEVICE — DRSG KERLIX SUPER SPONGE 7310

## (undated) DEVICE — PACK MAJOR LAPAROTOMY LF SBA15MLFCA

## (undated) DEVICE — ESU PENCIL SMOKE EVAC W/ROCKER SWITCH 0703-047-000

## (undated) DEVICE — DRSG ABDOMINAL PAD UNSTERILE 5X9" 9190

## (undated) RX ORDER — SODIUM CHLORIDE 9 MG/ML
INJECTION, SOLUTION INTRAVENOUS
Status: DISPENSED
Start: 2019-07-09

## (undated) RX ORDER — LIDOCAINE HYDROCHLORIDE 20 MG/ML
INJECTION, SOLUTION EPIDURAL; INFILTRATION; INTRACAUDAL; PERINEURAL
Status: DISPENSED
Start: 2022-07-18

## (undated) RX ORDER — GLYCOPYRROLATE 0.2 MG/ML
INJECTION, SOLUTION INTRAMUSCULAR; INTRAVENOUS
Status: DISPENSED
Start: 2022-07-18

## (undated) RX ORDER — LATANOPROST 50 UG/ML
SOLUTION/ DROPS OPHTHALMIC
Status: DISPENSED
Start: 2022-07-19

## (undated) RX ORDER — FENTANYL CITRATE 50 UG/ML
INJECTION, SOLUTION INTRAMUSCULAR; INTRAVENOUS
Status: DISPENSED
Start: 2022-07-18

## (undated) RX ORDER — PROPOFOL 10 MG/ML
INJECTION, EMULSION INTRAVENOUS
Status: DISPENSED
Start: 2022-07-18